# Patient Record
Sex: MALE | Race: BLACK OR AFRICAN AMERICAN | Employment: UNEMPLOYED | URBAN - METROPOLITAN AREA
[De-identification: names, ages, dates, MRNs, and addresses within clinical notes are randomized per-mention and may not be internally consistent; named-entity substitution may affect disease eponyms.]

---

## 2017-08-27 ENCOUNTER — HOSPITAL ENCOUNTER (EMERGENCY)
Age: 22
Discharge: HOME | End: 2017-08-28
Attending: EMERGENCY MEDICINE
Payer: COMMERCIAL

## 2017-08-27 VITALS
TEMPERATURE: 98.7 F | SYSTOLIC BLOOD PRESSURE: 131 MMHG | BODY MASS INDEX: 24.78 KG/M2 | RESPIRATION RATE: 20 BRPM | DIASTOLIC BLOOD PRESSURE: 56 MMHG | WEIGHT: 177 LBS | OXYGEN SATURATION: 99 % | HEIGHT: 71 IN | HEART RATE: 88 BPM

## 2017-08-27 DIAGNOSIS — J02.9 PHARYNGITIS, UNSPECIFIED ETIOLOGY: Primary | ICD-10-CM

## 2017-08-27 LAB
ANION GAP SERPL CALC-SCNC: 5 MMOL/L (ref 3–18)
BASOPHILS # BLD: 0.1 K/UL (ref 0–0.06)
BASOPHILS NFR BLD: 2 % (ref 0–2)
BUN SERPL-MCNC: 7 MG/DL (ref 7–18)
BUN/CREAT SERPL: 6 (ref 12–20)
CALCIUM SERPL-MCNC: 9.4 MG/DL (ref 8.5–10.1)
CHLORIDE SERPL-SCNC: 98 MMOL/L (ref 100–108)
CO2 SERPL-SCNC: 34 MMOL/L (ref 21–32)
CREAT SERPL-MCNC: 1.2 MG/DL (ref 0.6–1.3)
DIFFERENTIAL METHOD BLD: ABNORMAL
EOSINOPHIL # BLD: 0.1 K/UL (ref 0–0.4)
EOSINOPHIL NFR BLD: 1 % (ref 0–5)
ERYTHROCYTE [DISTWIDTH] IN BLOOD BY AUTOMATED COUNT: 13.2 % (ref 11.6–14.5)
GLUCOSE SERPL-MCNC: 97 MG/DL (ref 74–99)
HCT VFR BLD AUTO: 48.4 % (ref 36–48)
HGB BLD-MCNC: 15.5 G/DL (ref 13–16)
LYMPHOCYTES # BLD: 1.3 K/UL (ref 0.9–3.6)
LYMPHOCYTES NFR BLD: 20 % (ref 21–52)
MCH RBC QN AUTO: 27.9 PG (ref 24–34)
MCHC RBC AUTO-ENTMCNC: 32 G/DL (ref 31–37)
MCV RBC AUTO: 87.2 FL (ref 74–97)
MONOCYTES # BLD: 0.7 K/UL (ref 0.05–1.2)
MONOCYTES NFR BLD: 11 % (ref 3–10)
NEUTS SEG # BLD: 4.4 K/UL (ref 1.8–8)
NEUTS SEG NFR BLD: 66 % (ref 40–73)
PLATELET # BLD AUTO: 139 K/UL (ref 135–420)
PMV BLD AUTO: 10.3 FL (ref 9.2–11.8)
POTASSIUM SERPL-SCNC: 3.5 MMOL/L (ref 3.5–5.5)
RBC # BLD AUTO: 5.55 M/UL (ref 4.7–5.5)
SODIUM SERPL-SCNC: 137 MMOL/L (ref 136–145)
WBC # BLD AUTO: 6.7 K/UL (ref 4.6–13.2)

## 2017-08-27 PROCEDURE — 99281 EMR DPT VST MAYX REQ PHY/QHP: CPT

## 2017-08-27 PROCEDURE — 80048 BASIC METABOLIC PNL TOTAL CA: CPT | Performed by: PHYSICIAN ASSISTANT

## 2017-08-27 PROCEDURE — 85025 COMPLETE CBC W/AUTO DIFF WBC: CPT | Performed by: PHYSICIAN ASSISTANT

## 2017-08-27 PROCEDURE — 87081 CULTURE SCREEN ONLY: CPT | Performed by: EMERGENCY MEDICINE

## 2017-08-27 PROCEDURE — 87804 INFLUENZA ASSAY W/OPTIC: CPT | Performed by: EMERGENCY MEDICINE

## 2017-08-27 NOTE — LETTER
[facility] EMERGENCY DEPT 
[address] [facility] [address] 
[phone] Work/School Note Date: 8/27/2017 To Whom It May concern: 
 
Monica Selby - please excuse him from work 8/28/2017.  
 
Sincerely, 
 
 
 
 
,Cesar Tolentino RN BSN JAGDEEP NRP

## 2017-08-27 NOTE — LETTER
[address] [place] [address] [facility] EMERGENCY DEPT 
[address] [facility] [address] [phone] Work/School Note Date: 8/27/2017 To Whom It May concern: Taiwo Sanders was seen and treated today in the emergency room by the following provider(s): 
Attending Provider: Luke Mancilla MD. Taiwo Sanders - please excuse him from work 8/28/2017. Sincerely, Ivanranda Sellers RN BSN JAGDEEP NRP

## 2017-08-28 LAB
FLUAV AG NPH QL IA: NEGATIVE
FLUBV AG NOSE QL IA: NEGATIVE

## 2017-08-28 RX ORDER — AMOXICILLIN 500 MG/1
500 TABLET, FILM COATED ORAL 3 TIMES DAILY
Qty: 30 TAB | Refills: 0 | Status: SHIPPED | OUTPATIENT
Start: 2017-08-28 | End: 2019-10-22

## 2017-08-28 NOTE — DISCHARGE INSTRUCTIONS
Sore Throat: Care Instructions  Your Care Instructions    Infection by bacteria or a virus causes most sore throats. Cigarette smoke, dry air, air pollution, allergies, and yelling can also cause a sore throat. Sore throats can be painful and annoying. Fortunately, most sore throats go away on their own. If you have a bacterial infection, your doctor may prescribe antibiotics. Follow-up care is a key part of your treatment and safety. Be sure to make and go to all appointments, and call your doctor if you are having problems. It's also a good idea to know your test results and keep a list of the medicines you take. How can you care for yourself at home? · If your doctor prescribed antibiotics, take them as directed. Do not stop taking them just because you feel better. You need to take the full course of antibiotics. · Gargle with warm salt water once an hour to help reduce swelling and relieve discomfort. Use 1 teaspoon of salt mixed in 1 cup of warm water. · Take an over-the-counter pain medicine, such as acetaminophen (Tylenol), ibuprofen (Advil, Motrin), or naproxen (Aleve). Read and follow all instructions on the label. · Be careful when taking over-the-counter cold or flu medicines and Tylenol at the same time. Many of these medicines have acetaminophen, which is Tylenol. Read the labels to make sure that you are not taking more than the recommended dose. Too much acetaminophen (Tylenol) can be harmful. · Drink plenty of fluids. Fluids may help soothe an irritated throat. Hot fluids, such as tea or soup, may help decrease throat pain. · Use over-the-counter throat lozenges to soothe pain. Regular cough drops or hard candy may also help. These should not be given to young children because of the risk of choking. · Do not smoke or allow others to smoke around you. If you need help quitting, talk to your doctor about stop-smoking programs and medicines.  These can increase your chances of quitting for good. · Use a vaporizer or humidifier to add moisture to your bedroom. Follow the directions for cleaning the machine. When should you call for help? Call your doctor now or seek immediate medical care if:  · You have new or worse trouble swallowing. · Your sore throat gets much worse on one side. Watch closely for changes in your health, and be sure to contact your doctor if you do not get better as expected. Where can you learn more? Go to http://sebastien-thlaia.info/. Enter [phone] in the search box to learn more about \"Sore Throat: Care Instructions. \"  Current as of: July 29, 2016  Content Version: 11.3  © 2451-9188 [employer]. Care instructions adapted under license by [employer] (which disclaims liability or warranty for this information). If you have questions about a medical condition or this instruction, always ask your healthcare professional. [address] any warranty or liability for your use of this information.

## 2017-08-28 NOTE — ED NOTES
22 yo M with no significant PMH who presents to the ED cough, subjective fever, HA, and neck pain x 3 days. Mom notes daughters had similar symptoms last week. Denies sore throat, ear pressure, n/v. Took Tylenol PTA    I performed a brief evaluation, including history and physical, of the patient here in triage and I have determined that pt will need further treatment and evaluation from the main side ER physician. I have placed initial orders to help in expediting patients care.      August 27, 2017 at 11:00 PM - Yonatan Kang        Visit Vitals    /56 (BP 1 Location: Left arm, BP Patient Position: At rest)    Pulse 88    Temp 98.7 °F (37.1 °C)    Resp 20    Ht 5' 11\" (1.803 m)    Wt 80.3 kg (177 lb)    SpO2 99%    BMI 24.69 kg/m2

## 2017-08-28 NOTE — ED NOTES
Pt instructed to follow up with his PCP, take his antibiotics without fail, perform good oral care, and to return for worsening pain/swallowing/fever/photophobia/other concerns.

## 2017-08-28 NOTE — ED PROVIDER NOTES
HPI Comments: 11:17 PM Alberto Bell is a 23 y.o. male  presents to the ED c/o a cough which began 2 days ago. Pt also c/o fever, chills, sore throat, neck pain and a headache. Pt denies any sick contact. PCP: Stephanie Burk MD      The history is provided by the patient. History reviewed. No pertinent past medical history. History reviewed. No pertinent surgical history. History reviewed. No pertinent family history. Social History     Social History    Marital status: SINGLE     Spouse name: N/A    Number of children: N/A    Years of education: N/A     Occupational History    Not on file. Social History Main Topics    Smoking status: Never Smoker    Smokeless tobacco: Never Used    Alcohol use Yes      Comment: Socially    Drug use: No    Sexual activity: Not on file     Other Topics Concern    Not on file     Social History Narrative         ALLERGIES: Review of patient's allergies indicates no known allergies. Review of Systems   Constitutional: Positive for chills and fever. HENT: Positive for sore throat. Eyes: Negative. Respiratory: Positive for cough. Cardiovascular: Negative. Gastrointestinal: Negative. Genitourinary: Negative. Musculoskeletal: Positive for neck pain. Skin: Negative. Allergic/Immunologic: Negative. Neurological: Positive for headaches. Hematological: Negative. Psychiatric/Behavioral: Negative. All other systems reviewed and are negative. Vitals:    08/27/17 2257   BP: 131/56   Pulse: 88   Resp: 20   Temp: 98.7 °F (37.1 °C)   SpO2: 99%   Weight: 80.3 kg (177 lb)   Height: 5' 11\" (1.803 m)            Physical Exam   Constitutional: He is oriented to person, place, and time. He appears well-developed and well-nourished. No distress. HENT:   Head: Normocephalic. Mouth/Throat: Oropharynx is clear and moist.   Eyes: Conjunctivae and EOM are normal. Pupils are equal, round, and reactive to light.    Neck: Normal range of motion. Neck supple. Cardiovascular: Normal rate, regular rhythm, normal heart sounds and intact distal pulses. No murmur heard. Pulmonary/Chest: Effort normal and breath sounds normal. No respiratory distress. He has no wheezes. He has no rales. He exhibits no tenderness. Abdominal: Soft. Bowel sounds are normal. He exhibits no distension. There is no tenderness. There is no rebound. Musculoskeletal: Normal range of motion. He exhibits no edema or tenderness. Neurological: He is alert and oriented to person, place, and time. No cranial nerve deficit. He exhibits normal muscle tone. Coordination normal.   Skin: Skin is warm and dry. No rash noted. Psychiatric: He has a normal mood and affect. His behavior is normal. Judgment and thought content normal.   Nursing note and vitals reviewed. MDM  Number of Diagnoses or Management Options  Pharyngitis, unspecified etiology: new and requires workup  Risk of Complications, Morbidity, and/or Mortality  Presenting problems: moderate  Diagnostic procedures: moderate  Management options: moderate    Patient Progress  Patient progress: stable    ED Course       Procedures     Vitals:  Patient Vitals for the past 12 hrs:   Temp Pulse Resp BP SpO2   08/27/17 2257 98.7 °F (37.1 °C) 88 20 131/56 99 %       X-Ray, CT or other radiology findings or impressions:  No orders to display       Orders:  Orders Placed This Encounter    CBC WITH AUTOMATED DIFF     Standing Status:   Standing     Number of Occurrences:   1    METABOLIC PANEL, BASIC     Standing Status:   Standing     Number of Occurrences:   1       Reevaluation, Progress notes, Consult notes, or additional Procedure notes: patient remained stable.       Diagnosis:     Disposition: D/C    Follow-up Information     None           Patient's Medications    No medications on file         Scribe Attestation           Rexann Holstein acting as a scribe for and in the presence of Yash Mitchell MD              August 27, 2017 at 11:17 PM                            Provider Attestation:           I personally performed the services described in the documentation, reviewed the documentation, as recorded by the scribe in my presence, and it accurately and completely records my words and actions.  August 27, 2017 at 11:17 PM - Peg Early MD

## 2017-08-30 LAB
B-HEM STREP THROAT QL CULT: NEGATIVE
BACTERIA SPEC CULT: NORMAL
SERVICE CMNT-IMP: NORMAL

## 2019-10-22 ENCOUNTER — APPOINTMENT (OUTPATIENT)
Dept: GENERAL RADIOLOGY | Age: 24
End: 2019-10-22
Attending: EMERGENCY MEDICINE
Payer: COMMERCIAL

## 2019-10-22 ENCOUNTER — HOSPITAL ENCOUNTER (EMERGENCY)
Age: 24
Discharge: HOME | End: 2019-10-23
Attending: EMERGENCY MEDICINE
Payer: COMMERCIAL

## 2019-10-22 VITALS
WEIGHT: 180 LBS | TEMPERATURE: 99.2 F | SYSTOLIC BLOOD PRESSURE: 128 MMHG | BODY MASS INDEX: 25.77 KG/M2 | HEIGHT: 70 IN | OXYGEN SATURATION: 100 % | RESPIRATION RATE: 14 BRPM | DIASTOLIC BLOOD PRESSURE: 80 MMHG | HEART RATE: 91 BPM

## 2019-10-22 DIAGNOSIS — S86.912A STRAIN OF LEFT KNEE, INITIAL ENCOUNTER: Primary | ICD-10-CM

## 2019-10-22 PROCEDURE — 99283 EMERGENCY DEPT VISIT LOW MDM: CPT

## 2019-10-22 PROCEDURE — 73560 X-RAY EXAM OF KNEE 1 OR 2: CPT

## 2019-10-22 RX ORDER — IBUPROFEN 600 MG/1
600 TABLET ORAL
Qty: 18 TAB | Refills: 0 | Status: SHIPPED | OUTPATIENT
Start: 2019-10-22

## 2019-10-22 NOTE — LETTER
[address] [place] [address] [facility] EMERGENCY DEPT 
[address] [facility] [address] 
[phone] Work/School Note Date: 10/22/2019 To Whom It May concern: Mickiel Meckel was seen and treated today in the emergency room by the following provider(s): 
Attending Provider: Nica Christiansen MD. Mickiel Meckel may return to work on 10/25/19. Sincerely, 
 
 
 
 
/bry

## 2019-10-23 NOTE — ED PROVIDER NOTES
Pt c/o l knee pain, says twisted when landed playing b ball about 2 hours pta. No other leg pain. No weakness or numbness. No fever. C/o walking w a limp due to pain. No meds taken pta. No rash or redness. Denies other injury. History reviewed. No pertinent past medical history. History reviewed. No pertinent surgical history. History reviewed. No pertinent family history. Social History     Socioeconomic History    Marital status: SINGLE     Spouse name: Not on file    Number of children: Not on file    Years of education: Not on file    Highest education level: Not on file   Occupational History    Not on file   Social Needs    Financial resource strain: Not on file    Food insecurity:     Worry: Not on file     Inability: Not on file    Transportation needs:     Medical: Not on file     Non-medical: Not on file   Tobacco Use    Smoking status: Never Smoker    Smokeless tobacco: Never Used   Substance and Sexual Activity    Alcohol use: Yes     Comment: Socially    Drug use: No    Sexual activity: Not on file   Lifestyle    Physical activity:     Days per week: Not on file     Minutes per session: Not on file    Stress: Not on file   Relationships    Social connections:     Talks on phone: Not on file     Gets together: Not on file     Attends Synagogue service: Not on file     Active member of club or organization: Not on file     Attends meetings of clubs or organizations: Not on file     Relationship status: Not on file    Intimate partner violence:     Fear of current or ex partner: Not on file     Emotionally abused: Not on file     Physically abused: Not on file     Forced sexual activity: Not on file   Other Topics Concern    Not on file   Social History Narrative    Not on file         ALLERGIES: Patient has no known allergies. Review of Systems   Constitutional: Negative for fever. Respiratory: Negative for shortness of breath.     Gastrointestinal: Negative for nausea. Musculoskeletal: Positive for arthralgias. Negative for back pain. Skin: Negative for wound. Neurological: Negative for weakness and numbness. All other systems reviewed and are negative. Vitals:    10/22/19 2244   BP: 128/80   Pulse: 91   Resp: 14   Temp: 99.2 °F (37.3 °C)   SpO2: 100%   Weight: 81.6 kg (180 lb)   Height: 5' 10\" (1.778 m)            Physical Exam   Constitutional: He is oriented to person, place, and time. He appears well-developed. HENT:   Head: Normocephalic and atraumatic. Neck: Normal range of motion. Pulmonary/Chest: Effort normal.   Musculoskeletal: He exhibits tenderness. + left mild diffuse ttp l knee, w mild swelling. Pain w deep flexion but from present. No other leg ttp. nvi     Neurological: He is alert and oriented to person, place, and time. Skin: Skin is warm and dry. Capillary refill takes less than 2 seconds. No rash noted. Psychiatric: He has a normal mood and affect. Nursing note and vitals reviewed. MDM       Procedures        Vitals:  No data found. Medications ordered:   Medications - No data to display      Lab findings:  No results found for this or any previous visit (from the past 12 hour(s)). X-Ray, CT or other radiology findings or impressions:  XR KNEE LT MAX 2 VWS   Final Result   IMPRESSION:       Moderate joint effusion. Progress notes, Consult notes or additional Procedure notes:   No emc. From. Nvi. Stable for dc and close f/u. Det ret inst given. Pt verb und. No s/o other injury. Diagnosis:   1.  Strain of left knee, initial encounter        Disposition: home    Follow-up Information     Follow up With Specialties Details Why Contact Info    Norman Sal MD Orthopedic Surgery Schedule an appointment as soon as possible for a visit in 1 week As needed [address] ([facility])  [phone]             Discharge Medication List as of 10/22/2019 11:31 PM      START taking these medications    Details   ibuprofen (MOTRIN) 600 mg tablet Take 1 Tab by mouth every six (6) hours as needed for Pain., Print, Disp-18 Tab, R-0

## 2019-10-23 NOTE — ED NOTES
Received report from off-going-shift RN (Avelina),and assumed care of patient. Call bell with reach.

## 2019-10-23 NOTE — ED TRIAGE NOTES
Pt was at gym playing basket ball . .. He twisted his lt knee @ 2 hrs ago. .. Says, \"I think I dislocated my knee\"  +carol. Baudilio Mckeon

## 2019-10-25 ENCOUNTER — OFFICE VISIT (OUTPATIENT)
Dept: ORTHOPEDIC SURGERY | Facility: CLINIC | Age: 24
End: 2019-10-25

## 2019-10-25 VITALS
TEMPERATURE: 97 F | HEIGHT: 70 IN | DIASTOLIC BLOOD PRESSURE: 70 MMHG | HEART RATE: 87 BPM | SYSTOLIC BLOOD PRESSURE: 121 MMHG | RESPIRATION RATE: 16 BRPM

## 2019-10-25 DIAGNOSIS — M25.00 HEMARTHROSIS: ICD-10-CM

## 2019-10-25 DIAGNOSIS — S83.005A DISLOCATION OF LEFT PATELLA, INITIAL ENCOUNTER: Primary | ICD-10-CM

## 2019-10-25 DIAGNOSIS — M25.562 ACUTE PAIN OF LEFT KNEE: ICD-10-CM

## 2019-10-25 RX ORDER — IBUPROFEN 600 MG/1
TABLET ORAL
COMMUNITY

## 2019-10-25 NOTE — PROGRESS NOTES
1. Have you been to the ER, urgent care clinic since your last visit? Hospitalized since your last visit? Yes, [facility] ED    2. Have you seen or consulted any other health care providers outside of the [address] [place] [address] since your last visit? Include any pap smears or colon screening.    Yes, See above

## 2019-10-25 NOTE — LETTER
NOTIFICATION RETURN TO WORK  
 
10/25/2019 1:57 PM 
 
Mr. Jaye Gilliland [address] 
[facility] [address] To Whom It May Concern: Jaye Gilliland is currently under the care of [address] [place] [address]. He will return to work on: 11-25-19--no restrictions. He is to remain out of work until then. If there are questions or concerns please have the patient contact our office.  
 
 
 
Sincerely, 
 
 
Kimani Wild MD

## 2019-10-25 NOTE — PROGRESS NOTES
Patient: Cynthia Romeo                MRN: [id number]       SSN: [id number]  YOB: 1995        AGE: 27 y.o. SEX: male    PCP: No primary care provider on file. 10/25/19    Chief Complaint   Patient presents with    Knee Pain     left knee pain     HISTORY:  Cynthia Romeo is a 27 y.o. male who sustained a left knee basketball injury on 10/22/19. He felt a sudden pain when he landed wrong when he went up for a rebound. He landed wrong as he collided with another player. Mr. Teresa Alexander brother reduced the obvious patella dislocation with leg extension and pressure applied. [facility] where left knee x rays revealed large effusion. He was provided a knee immobilizer, ibuprofen and orthopedic referral. He has been experiencing left knee pain and swelling since the injury. He states he has only slept 4 total hours since his injury due to pain and swelling. Pain Assessment  10/25/2019   Location of Pain Knee   Location Modifiers Left   Severity of Pain 8   Quality of Pain Throbbing; Sharp   Duration of Pain Persistent   Frequency of Pain Constant   Aggravating Factors Bending; Other (Comment)   Aggravating Factors Comment when laying down & with any type of movements   Limiting Behavior Yes   Relieving Factors NSAID;Ice;Elevation   Result of Injury Yes   Work-Related Injury No   Type of Injury Fall     Occupation, etc:  Mr. 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 He lives in [place] with his parents, 3 sisters, and brother. Mr. Polina Vargas is 5'10\" tall. No results found for: HBA1C, HGBE8, KDJ5CJTN, YCW3PNRE, KLG3JDGD  Weight Metrics 10/25/2019   Weight -       There is no problem list on file for this patient. REVIEW OF SYSTEMS: All Below are Negative except: See HPI   Constitutional: negative for fever, chills, and weight loss.    Cardiovascular: negative for chest pain, claudication, leg swelling, SOB, COSBY   Gastrointestinal: Negative for pain, N/V/C/D, Blood in stool or urine, dysuria, hematuria, incontinence, pelvic pain. Musculoskeletal: See HPI   Neurological: Negative for dizziness and weakness. Negative for headaches, Visual changes, confusion, seizures   Phychiatric/Behavioral: Negative for depression, memory loss, substance abuse. Extremities: Negative for hair changes, rash, or skin lesion changes. Hematologic: Negative for bleeding problems, bruising, pallor or swollen lymph nodes   Peripheral Vascular: No calf pain, no circulation deficits.     Social History     Socioeconomic History    Marital status: SINGLE     Spouse name: Not on file    Number of children: Not on file    Years of education: Not on file    Highest education level: Not on file   Occupational History    Not on file   Social Needs    Financial resource strain: Not on file    Food insecurity:     Worry: Not on file     Inability: Not on file    Transportation needs:     Medical: Not on file     Non-medical: Not on file   Tobacco Use    Smoking status: Never Smoker    Smokeless tobacco: Never Used   Substance and Sexual Activity    Alcohol use: Not on file    Drug use: Not on file    Sexual activity: Not on file   Lifestyle    Physical activity:     Days per week: Not on file     Minutes per session: Not on file    Stress: Not on file   Relationships    Social connections:     Talks on phone: Not on file     Gets together: Not on file     Attends Scientology service: Not on file     Active member of club or organization: Not on file     Attends meetings of clubs or organizations: Not on file     Relationship status: Not on file    Intimate partner violence:     Fear of current or ex partner: Not on file     Emotionally abused: Not on file     Physically abused: Not on file     Forced sexual activity: Not on file   Other Topics Concern    Not on file   Social History Narrative    Not on file      No Known Allergies   Current Outpatient Medications   Medication Sig  ibuprofen (MOTRIN) 600 mg tablet Take  by mouth every six (6) hours as needed for Pain. No current facility-administered medications for this visit. PHYSICAL EXAMINATION:  Visit Vitals  /70 (BP 1 Location: Left arm, BP Patient Position: Sitting)   Pulse 87   Temp 97 °F (36.1 °C) (Oral)   Resp 16   Ht 5' 10\" (1.778 m)      ORTHO EXAMINATION:  Examination Right knee Left knee   Skin Intact Intact   Range of motion 120-0 120-0   Effusion - ++++   Medial joint line tenderness - + medial retinaculum   Lateral joint line tenderness - -   Popliteal tenderness - -   Osteophytes palpable - -   Ginnas - -   Patella crepitus - -   Anterior drawer - -   Lateral laxity - -   Medial laxity - -   Varus deformity - -   Valgus deformity - -   Pretibial edema - -   Calf tenderness - -     Active knee extension is intact, guarding due to pain    TIME OUT:  Chart reviewed for the following:   Adams Hartley MD, have reviewed the History, Physical and updated the Allergic reactions for Alden Wakefield   TIME OUT performed immediately prior to start of procedure:  Adams Hartley MD, have performed the following reviews on Sam Shaper prior to the start of the procedure:          * Patient was identified by name and date of birth   * Agreement on procedure being performed was verified  * Risks and Benefits explained to the patient  * Procedure site verified and marked as necessary  * Patient was positioned for comfort  * Consent was obtained     Time: 1:50 PM     Date of procedure: 10/25/2019  Procedure performed by:  Maira Matthews MD  Mr. Celsa Lobo tolerated the procedure well with no complications. RADIOGRAPHS:  XR LEFT KNEE 10/23/19 [facility]  IMPRESSION: Moderate joint effusion  -I have independently reviewed these images during this office visit. -Dr. Demarcus Bunch:  Three views - No fractures, + effusion, no joint space narrowing, no osteophytes present.  Kellgren David grade 0, patella is well located      IMPRESSION:      ICD-10-CM ICD-9-CM    1. Dislocation of left patella, initial encounter S83.005A 836.3 REFERRAL TO PHYSICAL THERAPY   2. Acute pain of left knee M25.562 719.46 REFERRAL TO PHYSICAL THERAPY   3. Hemarthrosis M25.00 719.10 DRAIN/INJECT LARGE JOINT/BURSA     PLAN:  After timeout and under sterile conditions, left knee aspirated 185 cc of bloody fluid. The fluid was discarded. There is no need for surgery at this time. He will start a brief course of outpatient physical therapy. Continue using knee immobilizer. OTC analgesics for pain. Stay out of work for 4 weeks, resume full activity at that time. Work note provided. ACE wrap provided. He will follow up as needed.       Scribed by Feliciano Chapman ([address]) as dictated by Genevieve Adames MD

## 2019-10-31 ENCOUNTER — HOSPITAL ENCOUNTER (OUTPATIENT)
Dept: PHYSICAL THERAPY | Age: 24
Discharge: HOME | End: 2019-10-31
Payer: COMMERCIAL

## 2019-10-31 PROCEDURE — 97014 ELECTRIC STIMULATION THERAPY: CPT

## 2019-10-31 PROCEDURE — 97110 THERAPEUTIC EXERCISES: CPT

## 2019-10-31 PROCEDURE — 97161 PT EVAL LOW COMPLEX 20 MIN: CPT

## 2019-10-31 NOTE — PROGRESS NOTES
PT DAILY TREATMENT NOTE 8-14    PPatient Name: Gonzalo Stapleton  Date:10/31/2019  : 1995  [x]  Patient  Verified  Payor: BLUE CROSS / Plan: [facility] / Product Type: PPO /    In time:1010  Out time:1050  Total Treatment Time (min): 40  Visit #: 1 of 10    Medicare/BCBS Only   Total Timed Codes (min):  15 1:1 Treatment Time:  30       Treatment Area: Pain in left knee [M25.562]    SUBJECTIVE  Pain Level (0-10 scale): 5  Any medication changes, allergies to medications, adverse drug reactions, diagnosis change, or new procedure performed?: [x] No    [] Yes (see summary sheet for update)  Subjective functional status/changes:   [x] see eval form in paper chart    OBJECTIVE  Modality rationale: decrease edema, decrease inflammation and decrease pain to improve the patients ability to tolerate flexion and wt bearing   Min Type Additional Details   10 [x] Estim:  [x]Unatt       [x]IFC  []Premod                        []Other:  [x]w/ice   []w/heat  Position: supine with towel roll  Location: left knee    [] Estim: []Att    []TENS instruct  []NMES                    []Other:  []w/US   []w/ice   []w/heat  Position:  Location:    []  Traction: [] Cervical       []Lumbar                       [] Prone          []Supine                       []Intermittent   []Continuous Lbs:  [] before manual  [] after manual    []  Ultrasound: []Continuous   [] Pulsed                           []1MHz   []3MHz Location:  W/cm2:    []  Iontophoresis with dexamethasone         Location: [] Take home patch   [] In clinic    []  Ice     []  heat  []  Ice massage  []  Laser   []  Anodyne Position:  Location:    []  Laser with stim  []  Other: Position:  Location:    []  Vasopneumatic Device Pressure:       [] lo [] med [] hi   Temperature: [] lo [] med [] hi   [x] Skin assessment post-treatment:  [x]intact []redness- no adverse reaction    []redness  adverse reaction:   15 min Therapeutic Exercise:  [x] See flow sheet : including PROM left knee flexion to tolerance with patellar blocking   Rationale: increase ROM and increase strength to improve the patients ability to tolerate activity and increase wt bearing tolerance          With   [x] TE   [] TA   [] neuro   [] other: Patient Education: [x] Review HEP    [] Progressed/Changed HEP based on:   [x] positioning   [] body mechanics   [] transfers   [x] heat/ice application    [] other:        Pain Level (0-10 scale) post treatment: 4    ASSESSMENT/Changes in Function:       [x]  See Plan of Care  []  See progress note/recertification  []  See Discharge Summary         Goals:  Short Term Goals: To be accomplished in 1 weeks:  1. Establish HEP for ROM and strengthening  Eval: established and reviewed; MD contacted for clarification of wt bearing status and use of immobilizer (currently following ER recommendations)     Long Term Goals: To be accomplished in 10 treatments:  1. Patient will be independent with HEP for ROM and strengthening  Eval: established  Current:  2. Patient will demonstrate 0-120 degrees AROM left knee to increase ease of ADLs  Eval: 25-65 with pain and guarding  Current:  3. Patient will increase FOTO > 25 pts to increase functional mobility and facilitate return to work  Eval: 26  Current:  4.  Patient will ascend and descend steps with reciprocal pattern to increase ease of entry into home and bedroom  Eval: step to or bumping up as able  Current:    PLAN  []  Upgrade activities as tolerated     [x]  Continue plan of care  []  Update interventions per flow sheet       []  Discharge due to:_  []  Other:_      Miroslava Wilhelm, PT 10/31/2019  11:11 AM

## 2019-10-31 NOTE — PROGRESS NOTES
[facility] [address]  [address] [facility] [address], [address] [phone] fax    Plan of Care/ Statement of Necessity for Physical Therapy Services           Patient name: Sotero Worthy Start of Care: 10/31/2019   Referral source: Murphy Shepherd MD : 1995    Medical Diagnosis: Pain in left knee [M25.562]  Payor: BLUE CROSS / Plan: [employer] [facility] [place] / Product Type: PPO /  Onset Date:10/22/19    Treatment Diagnosis: left knee pain   Prior Hospitalization: see medical history Provider#: [id number]   Medications: Verified on Patient summary List    Comorbidities: none reported   Prior Level of Function: independent, working FT at [employer], active with basketball    The Plan of Care and following information is based on the information from the initial evaluation. Assessment/ key information: Patient is a 27 y.o.male who presents to PT with Pain in left knee [M25.562]. He reports he was playing basketball, had a mid air collision, then landed hard with pain. He then put on his brace and continued to play until falling again, then realizing he needed to go to the ER due to patellar dislocation and effusion. He presents today with knee immobilizer, crutches and NWB status (per ER) with very guarded motion and pain. The patient will benefit from skilled PT services to address these deficits and facilitate return to premorbid activity level and improved quality of life.     Evaluation Complexity History LOW Complexity : Zero comorbidities / personal factors that will impact the outcome / POC; Examination MEDIUM Complexity : 3 Standardized tests and measures addressing body structure, function, activity limitation and / or participation in recreation  ;Presentation LOW Complexity : Stable, uncomplicated  ;Clinical Decision Making MEDIUM Complexity : FOTO score of 26-74  Overall Complexity Rating: LOW   Problem List: pain affecting function, decrease ROM, decrease strength, edema affecting function, impaired gait/ balance, decrease ADL/ functional abilitiies, decrease activity tolerance, decrease flexibility/ joint mobility and decrease transfer abilities   Treatment Plan may include any combination of the following: Therapeutic exercise, Therapeutic activities, Neuromuscular re-education, Physical agent/modality, Gait/balance training, Manual therapy, Patient education, Self Care training and Functional mobility training  Patient / Family readiness to learn indicated by: asking questions, trying to perform skills and interest  Persons(s) to be included in education: patient (P)  Barriers to Learning/Limitations: None  Patient Goal (s): get back to normal  Patient Self Reported Health Status: good  Rehabilitation Potential: good  Short Term Goals: To be accomplished in 1 weeks:  1. Establish HEP for ROM and strengthening    Long Term Goals: To be accomplished in 10 treatments:  1. Patient will be independent with HEP for ROM and strengthening  2. Patient will demonstrate 0-120 degrees AROM left knee to increase ease of ADLs  3. Patient will increase FOTO > 25 pts to increase functional mobility and facilitate return to work  4. Patient will ascend and descend steps with reciprocal pattern to increase ease of entry into home and bedroom    Frequency / Duration: Patient to be seen 2-3 times per week for 10 treatments. Patient/ Caregiver education and instruction: Diagnosis, prognosis, self care, activity modification, brace/ splint application and exercises   [x]  Plan of care has been reviewed with DAYAMI Frost, PT 10/31/2019 11:06 AM    ________________________________________________________________________    I certify that the above Therapy Services are being furnished while the patient is under my care. I agree with the treatment plan and certify that this therapy is necessary.     Physician's Signature:____________Date:_________TIME:________    ** Signature, Date and Time must be completed for valid certification **    Please sign and return to [facility] [address]  [address] [facility] [address], [address] [phone] fax

## 2019-11-04 ENCOUNTER — HOSPITAL ENCOUNTER (OUTPATIENT)
Dept: PHYSICAL THERAPY | Age: 24
Discharge: HOME | End: 2019-11-04
Payer: COMMERCIAL

## 2019-11-04 PROCEDURE — 97530 THERAPEUTIC ACTIVITIES: CPT

## 2019-11-04 PROCEDURE — 97110 THERAPEUTIC EXERCISES: CPT

## 2019-11-04 NOTE — PROGRESS NOTES
PT DAILY TREATMENT NOTE 10-18    Patient Name: Alberto Form  Date:2019  : 1995  [x]  Patient  Verified  Payor: BLUE CROSS / Plan: [address] [place] [address] [place] / Product Type: PPO /    In time:802  Out time:851  Total Treatment Time (min): 49  Visit #: 2 of 10    Medicare/BCBS Only   Total Timed Codes (min):  39 1:1 Treatment Time:  39       Treatment Area: No admission diagnoses are documented for this encounter.     SUBJECTIVE  Pain Level (0-10 scale): 3  Any medication changes, allergies to medications, adverse drug reactions, diagnosis change, or new procedure performed?: [x] No    [] Yes (see summary sheet for update)  Subjective functional status/changes:   [] No changes reported  Pt states he has been working on straightening his leg, does not ice as often as\"maybe I should\"    OBJECTIVE    Modality rationale: decrease edema, decrease inflammation and decrease pain to improve the patients ability to improve functional ROM for ambualtion   Min Type Additional Details    [] Estim:  []Unatt       []IFC  []Premod                        []Other:  []w/ice   []w/heat  Position:  Location:    [] Estim: []Att    []TENS instruct  []NMES                    []Other:  []w/US   []w/ice   []w/heat  Position:  Location:    []  Traction: [] Cervical       []Lumbar                       [] Prone          []Supine                       []Intermittent   []Continuous Lbs:  [] before manual  [] after manual    []  Ultrasound: []Continuous   [] Pulsed                           []1MHz   []3MHz W/cm2:  Location:    []  Iontophoresis with dexamethasone         Location: [] Take home patch   [] In clinic   10 [x]  Ice     []  heat  []  Ice massage  []  Laser   []  Anodyne Position: LE elevated in supine  Location: left knee    []  Laser with stim  []  Other:  Position:  Location:    []  Vasopneumatic Device Pressure:       [] lo [] med [] hi   Temperature: [] lo [] med [] hi   [] Skin assessment post-treatment:  [x]intact []redness- no adverse reaction    []redness  adverse reaction:     29 min Therapeutic Exercise:  [x] See flow sheet :   Rationale: increase ROM and increase strength to improve the patients ability to perform ADLs    10 min Therapeutic Activity:  [x]  See flow sheet :   Rationale: increase strength, improve coordination and improve balance  to improve the patients ability to ambulate with normalized pattern          With   [] TE   [] TA   [] neuro   [] other: Patient Education: [x] Review HEP    [] Progressed/Changed HEP based on:   [] positioning   [] body mechanics   [] transfers   [] heat/ice application    [] other:      Other Objective/Functional Measures:   Left knee AROM = 8-73degrees     Pain Level (0-10 scale) post treatment: 0    ASSESSMENT/Changes in Function: Pt was able to initiate exercises per flow sheet without increase in symptoms. Pt was instructed on progressively increasing weight bearing with axillary crutches. Pt ROM progressing slowly, pt remains guarded. Patient will continue to benefit from skilled PT services to modify and progress therapeutic interventions, address functional mobility deficits, address ROM deficits, address strength deficits, analyze and address soft tissue restrictions, analyze and cue movement patterns, analyze and modify body mechanics/ergonomics, assess and modify postural abnormalities and address imbalance/dizziness to attain remaining goals. []  See Plan of Care  []  See progress note/recertification  []  See Discharge Summary         Progress towards goals / Updated goals:  Short Term Goals: To be accomplished in 1 weeks:  1. Establish HEP for ROM and strengthening  Eval: established and reviewed; MD contacted for clarification of wt bearing status and use of immobilizer (currently following ER recommendations)   Reviewed, reports compliance     Long Term Goals: To be accomplished in 10 treatments:  1.  Patient will be independent with HEP for ROM and strengthening   Eval: established   Current: reports compliance  2. Patient will demonstrate 0-120 degrees AROM left knee to increase ease of ADLs   Eval: 25-65 with pain and guarding   Current: AROM = lacking 8-73  3. Patient will increase FOTO > 25 pts to increase functional mobility and facilitate return to work   Eval: 26   Current: to be reassessed at [address]  4.  Patient will ascend and descend steps with reciprocal pattern to increase ease of entry into home and bedroom   Eval: step to or bumping up as able   Current: gait training with progressive weight bearing    PLAN  [x]  Upgrade activities as tolerated     [x]  Continue plan of care  []  Update interventions per flow sheet       []  Discharge due to:_  []  Other:_      Kulwant Smith PT 11/4/2019  8:09 AM    Future Appointments   Date Time Provider Kal Juárez   11/6/2019  3:00 PM Petrona Benson, ASIA [facility]   11/8/2019  3:00 PM Petrona Benson PT [facility]   11/11/2019  8:30 AM Fallon Saxena [facility]   11/13/2019 11:30 AM [facility]   11/15/2019  8:30 AM Petrona Benson, ASIA [facility]

## 2019-11-06 ENCOUNTER — HOSPITAL ENCOUNTER (OUTPATIENT)
Dept: PHYSICAL THERAPY | Age: 24
Discharge: HOME | End: 2019-11-06
Payer: COMMERCIAL

## 2019-11-06 PROCEDURE — 97110 THERAPEUTIC EXERCISES: CPT

## 2019-11-06 PROCEDURE — 97530 THERAPEUTIC ACTIVITIES: CPT

## 2019-11-06 NOTE — PROGRESS NOTES
PT DAILY TREATMENT NOTE 10-18    Patient Name: Mickiel Meckel  Date:2019  : 1995  [x]  Patient  Verified  Payor: BLUE CROSS / Plan: [employer] [facility] / Product Type: PPO /    In time:306  Out time:355  Total Treatment Time (min): 49  Visit #: 3 of 10    Medicare/BCBS Only   Total Timed Codes (min):  39 1:1 Treatment Time:  39       Treatment Area: Pain in left knee [M25.562]    SUBJECTIVE  Pain Level (0-10 scale): 0  Any medication changes, allergies to medications, adverse drug reactions, diagnosis change, or new procedure performed?: [x] No    [] Yes (see summary sheet for update)  Subjective functional status/changes:   [] No changes reported  Pt has no significant changes to report today.      OBJECTIVE    Modality rationale: decrease edema and decrease inflammation to improve the patients ability to increased AROM knee flexion for ambulation    Min Type Additional Details    [] Estim:  []Unatt       []IFC  []Premod                        []Other:  []w/ice   []w/heat  Position:  Location:    [] Estim: []Att    []TENS instruct  []NMES                    []Other:  []w/US   []w/ice   []w/heat  Position:  Location:    []  Traction: [] Cervical       []Lumbar                       [] Prone          []Supine                       []Intermittent   []Continuous Lbs:  [] before manual  [] after manual    []  Ultrasound: []Continuous   [] Pulsed                           []1MHz   []3MHz W/cm2:  Location:    []  Iontophoresis with dexamethasone         Location: [] Take home patch   [] In clinic   10 [x]  Ice     []  heat  []  Ice massage  []  Laser   []  Anodyne Position: elevated supine  Location: left knee    []  Laser with stim  []  Other:  Position:  Location:    []  Vasopneumatic Device Pressure:       [] lo [] med [] hi   Temperature: [] lo [] med [] hi   [] Skin assessment post-treatment:  [x]intact []redness- no adverse reaction    []redness  adverse reaction:     24 min Therapeutic Exercise: [x] See flow sheet :   Rationale: increase ROM and increase strength to improve the patients ability to perform ADLs    15 min Therapeutic Activity:  [x]  See flow sheet : including gait training with SBA with unilateral crutch; forward ambulation in parallel bars 3 laps with 1UE and 3 laps without UE; lateral ambulation 3 laps with finger tips; tactile cues to increase weight bearing through left LE   Rationale: increase strength, improve coordination and improve balance  to improve the patients ability to ambulate without AD     With   [] TE   [] TA   [] neuro   [] other: Patient Education: [x] Review HEP    [] Progressed/Changed HEP based on:   [] positioning   [] body mechanics   [] transfers   [] heat/ice application    [] other:      Other Objective/Functional Measures:   Left knee AROM = 6-80 (PROM flexion 88)     Pain Level (0-10 scale) post treatment: 0    ASSESSMENT/Changes in Function: Pt continues to be apprehensive and demonstrate fear with weight bearing on left LE despite denying pain. Pt improving weight bearing with encouragement and tactile cues for weight shifting. Pt instructed on unilateral axillary crutch for ambulation and to increase use of elevation and icing at home due to persistent inflammation/effusion; pt verbalized understanding. Patient will continue to benefit from skilled PT services to modify and progress therapeutic interventions, address functional mobility deficits, address ROM deficits, address strength deficits, analyze and address soft tissue restrictions, analyze and cue movement patterns, analyze and modify body mechanics/ergonomics, assess and modify postural abnormalities, address imbalance/dizziness and instruct in home and community integration to attain remaining goals. []  See Plan of Care  []  See progress note/recertification  []  See Discharge Summary         Progress towards goals / Updated goals:  Short Term Goals: To be accomplished in 1 weeks:  1. Establish HEP for ROM and strengthening  Eval: established and reviewed; MD contacted for clarification of wt bearing status and use of immobilizer (currently following ER recommendations)              Reviewed, reports compliance     Long Term Goals: To be accomplished in 10 treatments:  1. Patient will be independent with HEP for ROM and strengthening              Eval: established              Current: reports compliance  2. Patient will demonstrate 0-120 degrees AROM left knee to increase ease of ADLs              Eval: 25-65 with pain and guarding              Current: AROM = lacking 6-80  3. Patient will increase FOTO > 25 pts to increase functional mobility and facilitate return to work              Eval: 26              Current: to be reassessed at [address]  4.  Patient will ascend and descend steps with reciprocal pattern to increase ease of entry into home and bedroom              Eval: step to or bumping up as able              Current: gait training with progressive weight bearing; improving reciprocal pattern with cuing     PLAN  [x]  Upgrade activities as tolerated     [x]  Continue plan of care  []  Update interventions per flow sheet       []  Discharge due to:_  []  Other:_      Rancho Dunn, PT 11/6/2019  4:37 PM    Future Appointments   Date Time Provider Kal Juárez   11/8/2019  3:00 PM Patria Marino, PT [facility]   11/11/2019  8:30 AM Corrinne Genera [facility]   11/13/2019 11:30 AM [facility]   11/15/2019  8:30 AM Patria Marino PT [facility]

## 2019-11-08 ENCOUNTER — HOSPITAL ENCOUNTER (OUTPATIENT)
Dept: PHYSICAL THERAPY | Age: 24
Discharge: HOME | End: 2019-11-08
Payer: COMMERCIAL

## 2019-11-08 ENCOUNTER — DOCUMENTATION ONLY (OUTPATIENT)
Dept: ORTHOPEDIC SURGERY | Age: 24
End: 2019-11-08

## 2019-11-08 PROCEDURE — 97530 THERAPEUTIC ACTIVITIES: CPT

## 2019-11-08 PROCEDURE — 97116 GAIT TRAINING THERAPY: CPT

## 2019-11-08 PROCEDURE — 97110 THERAPEUTIC EXERCISES: CPT

## 2019-11-08 NOTE — PROGRESS NOTES
Patient dropped of Return to Work Certification form.      Patient requested form be faxed to [phone]    Patient will  original from [facility]    Best contact [phone]

## 2019-11-08 NOTE — PROGRESS NOTES
PT DAILY TREATMENT NOTE 10-18    Patient Name: Yoly Driver  Date:2019  : 1995  [x]  Patient  Verified  Payor: BLUE CROSS / Plan: [facility] [place] / Product Type: PPO /    In time:302  Out time:358  Total Treatment Time (min): 56  Visit #: 4 of 10    Medicare/BCBS Only   Total Timed Codes (min):  46 1:1 Treatment Time:  46       Treatment Area: Pain in left knee [M25.562]    SUBJECTIVE  Pain Level (0-10 scale): 0  Any medication changes, allergies to medications, adverse drug reactions, diagnosis change, or new procedure performed?: [x] No    [] Yes (see summary sheet for update)  Subjective functional status/changes:   [] No changes reported  Pt states he has been elevating but not icing his knee.      OBJECTIVE    Modality rationale: decrease edema, decrease inflammation and decrease pain to improve the patients ability to increase knee flexion and extension for gait   Min Type Additional Details    [] Estim:  []Unatt       []IFC  []Premod                        []Other:  []w/ice   []w/heat  Position:  Location:    [] Estim: []Att    []TENS instruct  []NMES                    []Other:  []w/US   []w/ice   []w/heat  Position:  Location:    []  Traction: [] Cervical       []Lumbar                       [] Prone          []Supine                       []Intermittent   []Continuous Lbs:  [] before manual  [] after manual    []  Ultrasound: []Continuous   [] Pulsed                           []1MHz   []3MHz W/cm2:  Location:    []  Iontophoresis with dexamethasone         Location: [] Take home patch   [] In clinic   10 [x]  Ice     []  heat  []  Ice massage  []  Laser   []  Anodyne Position: elevated supine  Location: left knee    []  Laser with stim  []  Other:  Position:  Location:    []  Vasopneumatic Device Pressure:       [] lo [] med [] hi   Temperature: [] lo [] med [] hi   [] Skin assessment post-treatment:  [x]intact []redness- no adverse reaction    []redness  adverse reaction:     28 min Therapeutic Exercise:  [x] See flow sheet :   Rationale: increase ROM and increase strength to improve the patients ability to perform ADLs    10 min Therapeutic Activity:  [x]  See flow sheet :   Rationale: increase strength and improve coordination  to improve the patients ability to improve mobility within his home    8 min Gait Training: In parallel bars forward/lateral/retro with verbal cues and tactile cues for increased weight bearing and terminal knee extension   Rationale: increase weight bearing to facilitate gait with           With   [] TE   [] TA   [] neuro   [] other: Patient Education: [x] Review HEP    [] Progressed/Changed HEP based on:   [] positioning   [] body mechanics   [] transfers   [] heat/ice application    [] other:      Other Objective/Functional Measures:   Left knee AAROM flexion = 90degrees  Mid patellar girth: right = 33.5cm, left = 36.7cm     Pain Level (0-10 scale) post treatment: 3    ASSESSMENT/Changes in Function: Pt continues to demonstrate significant joint effusion, producing inhibition and significant extensor lag with SLR. Pt performing all exercises without brace today; pt continues to demonstrate apprehension and decreased weight bearing during ambulation requiring significant tactile facilitate to increase distribution. Pt improving but remaining reduced by the end of treatment. Patient will continue to benefit from skilled PT services to modify and progress therapeutic interventions, address functional mobility deficits, address ROM deficits, address strength deficits, analyze and address soft tissue restrictions, analyze and cue movement patterns, analyze and modify body mechanics/ergonomics, assess and modify postural abnormalities and address imbalance/dizziness to attain remaining goals.      []  See Plan of Care  []  See progress note/recertification  []  See Discharge Summary         Progress towards goals / Updated goals:  Short Term Goals: To be accomplished in 1 weeks:  1. Establish HEP for ROM and strengthening  Eval: established and reviewed; MD contacted for clarification of wt bearing status and use of immobilizer (currently following ER recommendations)              MET     Long Term Goals: To be accomplished in 10 treatments:  1. Patient will be independent with HEP for ROM and strengthening              Eval: established              Current: reports compliance  2. Patient will demonstrate 0-120 degrees AROM left knee to increase ease of ADLs              Eval: 25-65 with pain and guarding              Current: AAROM flexion = 90deg  3. Patient will increase FOTO > 25 pts to increase functional mobility and facilitate return to work              Eval: 26              Current: to be reassessed at [address]  4.  Patient will ascend and descend steps with reciprocal pattern to increase ease of entry into home and bedroom              Eval: step to or bumping up as able              Current: gait training with progressive weight bearing; improving reciprocal pattern with cuing     PLAN  [x]  Upgrade activities as tolerated     [x]  Continue plan of care  []  Update interventions per flow sheet       []  Discharge due to:_  []  Other:_      Gypsy Davila, PT 11/8/2019  3:54 PM    Future Appointments   Date Time Provider Kal Juárez   11/11/2019  8:30 AM [facility]   11/13/2019 11:30 AM [facility]   11/15/2019  8:30 AM [facility]

## 2019-11-11 ENCOUNTER — HOSPITAL ENCOUNTER (OUTPATIENT)
Dept: PHYSICAL THERAPY | Age: 24
Discharge: HOME | End: 2019-11-11
Payer: COMMERCIAL

## 2019-11-11 PROCEDURE — 97110 THERAPEUTIC EXERCISES: CPT

## 2019-11-11 PROCEDURE — 97530 THERAPEUTIC ACTIVITIES: CPT

## 2019-11-11 NOTE — PROGRESS NOTES
PT DAILY TREATMENT NOTE 10-18    Patient Name: Mickiel Meckel  Date:2019  : 1995  [x]  Patient  Verified  Payor: BLUE CROSS / Plan: [facility] [place] / Product Type: PPO /    In time:2:35  Out time:3:27  Total Treatment Time (min): 52  Visit #: 5 of 10    Medicare/BCBS Only   Total Timed Codes (min):  42 1:1 Treatment Time:  40       Treatment Area: Pain in left knee [M25.562]    SUBJECTIVE  Pain Level (0-10 scale): 0  Any medication changes, allergies to medications, adverse drug reactions, diagnosis change, or new procedure performed?: [x] No    [] Yes (see summary sheet for update)  Subjective functional status/changes:   [] No changes reported  Pt reports feeling better today    OBJECTIVE    Modality rationale: decrease inflammation and decrease pain to improve the patients ability to perform daily tasks   Min Type Additional Details    [] Estim:  []Unatt       []IFC  []Premod                        []Other:  []w/ice   []w/heat  Position:  Location:    [] Estim: []Att    []TENS instruct  []NMES                    []Other:  []w/US   []w/ice   []w/heat  Position:  Location:    []  Traction: [] Cervical       []Lumbar                       [] Prone          []Supine                       []Intermittent   []Continuous Lbs:  [] before manual  [] after manual    []  Ultrasound: []Continuous   [] Pulsed                           []1MHz   []3MHz W/cm2:  Location:    []  Iontophoresis with dexamethasone         Location: [] Take home patch   [] In clinic   10 [x]  Ice     []  heat  []  Ice massage  []  Laser   []  Anodyne Position: elevated supine   Location: left knee    []  Laser with stim  []  Other:  Position:  Location:    []  Vasopneumatic Device Pressure:       [] lo [] med [] hi   Temperature: [] lo [] med [] hi   [] Skin assessment post-treatment:  []intact []redness- no adverse reaction    []redness  adverse reaction:       32 min Therapeutic Exercise:  [x] See flow sheet :   Rationale: increase ROM and increase strength to improve the patients ability to perform ADLs    10 min Therapeutic Activity:  [x]  See flow sheet : lateral, fwd, retro amb in parallel bars with only occasional fingertip touch   Rationale: increase strength, improve balance and increase proprioception  to improve the patients ability to perform functional tasks         With   [] TE   [] TA   [] neuro   [] other: Patient Education: [x] Review HEP    [] Progressed/Changed HEP based on:   [] positioning   [] body mechanics   [] transfers   [] heat/ice application    [] other:      Other Objective/Functional Measures:   Occasional use of fingertip touch in parallel bars with fwd/lat/retro amb   Mid patellar girth left 36.3    Pain Level (0-10 scale) post treatment: 0    ASSESSMENT/Changes in Function: Cont's with ext lag with SLR. Reports of pain with end range AROM knee flex. Vc's for heel strike and TKE while amb in parallel bars. Patient will continue to benefit from skilled PT services to modify and progress therapeutic interventions, address functional mobility deficits, address ROM deficits, address strength deficits, analyze and address soft tissue restrictions, analyze and cue movement patterns, analyze and modify body mechanics/ergonomics and address imbalance/dizziness to attain remaining goals. []  See Plan of Care  []  See progress note/recertification  []  See Discharge Summary         Progress towards goals / Updated goals:  Short Term Goals: To be accomplished in 1 weeks:  1. Establish HEP for ROM and strengthening  Eval: established and reviewed; MD contacted for clarification of wt bearing status and use of immobilizer (currently following ER recommendations)              MET     Long Term Goals: To be accomplished in 10 treatments:  1. Patient will be independent with HEP for ROM and strengthening              Eval: established              Current: reports compliance  2.  Patient will demonstrate 0-120 degrees AROM left knee to increase ease of ADLs              Eval: 25-65 with pain and guarding              Current: AAROM flexion = 90deg  3. Patient will increase FOTO > 25 pts to increase functional mobility and facilitate return to work              Eval: 26              Current: to be reassessed at [address]  4.  Patient will ascend and descend steps with reciprocal pattern to increase ease of entry into home and bedroom              Eval: step to or bumping up as able              Current: gait training with progressive weight bearing; improving reciprocal pattern with cuing     PLAN  []  Upgrade activities as tolerated     [x]  Continue plan of care  []  Update interventions per flow sheet       []  Discharge due to:_  []  Other:_      Braden Caballero PTA 11/11/2019  2:46 PM    Future Appointments   Date Time Provider Kal Juárez   11/13/2019 11:30 AM [facility]   11/15/2019  8:30 AM [facility]

## 2019-11-13 ENCOUNTER — HOSPITAL ENCOUNTER (OUTPATIENT)
Dept: PHYSICAL THERAPY | Age: 24
Discharge: HOME | End: 2019-11-13
Payer: COMMERCIAL

## 2019-11-13 PROCEDURE — 97530 THERAPEUTIC ACTIVITIES: CPT

## 2019-11-13 PROCEDURE — 97110 THERAPEUTIC EXERCISES: CPT

## 2019-11-13 NOTE — PROGRESS NOTES
PT DAILY TREATMENT NOTE 10-18    Patient Name: Mynor Art  Date:2019  : 1995  [x]  Patient  Verified  Payor: BLUE CROSS / Plan: [employer] [facility] [place] / Product Type: PPO /    In time:1136  Out time:1226  Total Treatment Time (min): 50  Visit #: 6 of 10    Medicare/BCBS Only   Total Timed Codes (min):  40 1:1 Treatment Time:  40       Treatment Area: Pain in left knee [M25.562]    SUBJECTIVE  Pain Level (0-10 scale): 0  Any medication changes, allergies to medications, adverse drug reactions, diagnosis change, or new procedure performed?: [x] No    [] Yes (see summary sheet for update)  Subjective functional status/changes:   [] No changes reported  \"No pain. \"    OBJECTIVE    Modality rationale: decrease inflammation to improve the patients ability to improve mobility and gait   Min Type Additional Details    [] Estim:  []Unatt       []IFC  []Premod                        []Other:  []w/ice   []w/heat  Position:  Location:    [] Estim: []Att    []TENS instruct  []NMES                    []Other:  []w/US   []w/ice   []w/heat  Position:  Location:    []  Traction: [] Cervical       []Lumbar                       [] Prone          []Supine                       []Intermittent   []Continuous Lbs:  [] before manual  [] after manual    []  Ultrasound: []Continuous   [] Pulsed                           []1MHz   []3MHz W/cm2:  Location:    []  Iontophoresis with dexamethasone         Location: [] Take home patch   [] In clinic   10 [x]  Ice     []  heat  []  Ice massage  []  Laser   []  Anodyne Position: supine with wedge   Location: left knee    []  Laser with stim  []  Other:  Position:  Location:    []  Vasopneumatic Device Pressure:       [] lo [] med [] hi   Temperature: [] lo [] med [] hi   [x] Skin assessment post-treatment:  [x]intact []redness- no adverse reaction    []redness  adverse reaction:     15 min Therapeutic Exercise:  [x] See flow sheet :   Rationale: increase ROM and increase strength to improve the patients ability to perform ADLs    25 min Therapeutic Activity:  [x]  See flow sheet : weight shifting, gait sequencing   Rationale: increase ROM, increase strength, improve coordination, improve balance and increase proprioception  to improve the patients ability to improve mobility and gait        With   [x] TE   [x] TA   [] neuro   [] other: Patient Education: [x] Review HEP    [] Progressed/Changed HEP based on:   [x] positioning   [x] body mechanics   [] transfers   [] heat/ice application    [] other:      Other Objective/Functional Measures:      Pain Level (0-10 scale) post treatment: 0    ASSESSMENT/Changes in Function: Pt is making progress with his weight shifting and weight bearing. He is performing gait with a single axillary crutch on the right. He continues to have an extensor lag with SLR, but is improving. He did well with progression of exercises. Patient will continue to benefit from skilled PT services to modify and progress therapeutic interventions, address functional mobility deficits, address ROM deficits, address strength deficits, analyze and address soft tissue restrictions, analyze and cue movement patterns, analyze and modify body mechanics/ergonomics, assess and modify postural abnormalities, address imbalance/dizziness and instruct in home and community integration to attain remaining goals. [x]  See Plan of Care  []  See progress note/recertification  []  See Discharge Summary         Progress towards goals / Updated goals:  Short Term Goals: To be accomplished in 1 weeks:  1. Establish HEP for ROM and strengthening  Eval: established and reviewed; MD contacted for clarification of wt bearing status and use of immobilizer (currently following ER recommendations)              MET     Long Term Goals: To be accomplished in 10 treatments:  1.  Patient will be independent with HEP for ROM and strengthening              Eval: established              Current: reports compliance  2. Patient will demonstrate 0-120 degrees AROM left knee to increase ease of ADLs              Eval: 25-65 with pain and guarding              Current: AAROM flexion = 90deg  3. Patient will increase FOTO > 25 pts to increase functional mobility and facilitate return to work              Eval: 26              Current: to be reassessed at [address]  4.  Patient will ascend and descend steps with reciprocal pattern to increase ease of entry into home and bedroom              Eval: step to or bumping up as able              Current: gait training with progressive weight bearing; improving reciprocal pattern with cuing     PLAN  []  Upgrade activities as tolerated     [x]  Continue plan of care  []  Update interventions per flow sheet       []  Discharge due to:_  []  Other:_      Ethan Loja, PTA, CSCS 11/13/2019  12:31 PM    Future Appointments   Date Time Provider Kal Juárez   11/15/2019  8:30 AM [facility] [address]

## 2019-11-15 ENCOUNTER — HOSPITAL ENCOUNTER (OUTPATIENT)
Dept: PHYSICAL THERAPY | Age: 24
Discharge: HOME | End: 2019-11-15
Payer: COMMERCIAL

## 2019-11-15 ENCOUNTER — DOCUMENTATION ONLY (OUTPATIENT)
Dept: ORTHOPEDIC SURGERY | Facility: CLINIC | Age: 24
End: 2019-11-15

## 2019-11-15 PROCEDURE — 97110 THERAPEUTIC EXERCISES: CPT

## 2019-11-15 PROCEDURE — 97112 NEUROMUSCULAR REEDUCATION: CPT

## 2019-11-15 NOTE — PROGRESS NOTES
Return to Work Certification form filled out and faxed as requested. Confirmation received. Copy made for scanning. origninal placed in forms file at [place] [id number]. Patient called and notified.

## 2019-11-15 NOTE — PROGRESS NOTES
PT DAILY TREATMENT NOTE 10-18    Patient Name: Audra Redd  Date:11/15/2019  : 1995  [x]  Patient  Verified  Payor: BLUE CROSS / Plan: [employer] [facility] [place] / Product Type: PPO /    In time:841  Out time:937  Total Treatment Time (min): 56  Visit #: 7 of 10    Medicare/BCBS Only   Total Timed Codes (min):  46 1:1 Treatment Time:  35       Treatment Area: Pain in left knee [M25.562]    SUBJECTIVE  Pain Level (0-10 scale): 0  Any medication changes, allergies to medications, adverse drug reactions, diagnosis change, or new procedure performed?: [x] No    [] Yes (see summary sheet for update)  Subjective functional status/changes:   [] No changes reported  Pt has been working on [employer]    OBJECTIVE    Modality rationale: decrease edema and increase muscle contraction/control to improve the patients ability to perform SLR and facilitate DC of brace   Min Type Additional Details    [] Estim:  []Unatt       []IFC  []Premod                        []Other:  []w/ice   []w/heat  Position:  Location:    (billed in [facility]) [] Estim: []Att    []TENS instruct  [x]NMES                    []Other:  []w/US   []w/ice   []w/heat  Position: supine  Location: left quad     []  Traction: [] Cervical       []Lumbar                       [] Prone          []Supine                       []Intermittent   []Continuous Lbs:  [] before manual  [] after manual    []  Ultrasound: []Continuous   [] Pulsed                           []1MHz   []3MHz W/cm2:  Location:    []  Iontophoresis with dexamethasone         Location: [] Take home patch   [] In clinic   10 [x]  Ice     []  heat  []  Ice massage  []  Laser   []  Anodyne Position: elevated supine  Location: left knee    []  Laser with stim  []  Other:  Position:  Location:    []  Vasopneumatic Device Pressure:       [] lo [] med [] hi   Temperature: [] lo [] med [] hi   [] Skin assessment post-treatment:  [x]intact []redness- no adverse reaction    []redness  adverse reaction:     38 min Therapeutic Exercise:  [x] See flow sheet :   Rationale: increase ROM and increase strength to improve the patients ability to ambulate without AD    8 min Neuromuscular Re-education:  [x]  See flow sheet : NMES with tactile cues and performance of quad set and SLR   Rationale: increase strength and improve coordination  to improve the patients ability to perform SLR without extensor lag          With   [] TE   [] TA   [] neuro   [] other: Patient Education: [x] Review HEP    [] Progressed/Changed HEP based on:   [] positioning   [] body mechanics   [] transfers   [] heat/ice application    [] other:      Other Objective/Functional Measures: left knee AROM flexion = 98degrees     Pain Level (0-10 scale) post treatment: 0    ASSESSMENT/Changes in Function: Initiated NMES for quad neuromuscular re-education due to pt persistent extensor lag with SLR. Pt continued to have lag with application of NMEs. Pt ROM is progressing slowly secondary to \"tightness\" and effusion. Patient will continue to benefit from skilled PT services to modify and progress therapeutic interventions, address functional mobility deficits, address ROM deficits, address strength deficits, analyze and address soft tissue restrictions, analyze and cue movement patterns, analyze and modify body mechanics/ergonomics and assess and modify postural abnormalities to attain remaining goals. []  See Plan of Care  []  See progress note/recertification  []  See Discharge Summary         Progress towards goals / Updated goals:  Short Term Goals: To be accomplished in 1 weeks:  1. Establish HEP for ROM and strengthening  Eval: established and reviewed; MD contacted for clarification of wt bearing status and use of immobilizer (currently following ER recommendations)              MET     Long Term Goals: To be accomplished in 10 treatments:  1.  Patient will be independent with HEP for ROM and strengthening              Eval: established              Current: reports compliance  2. Patient will demonstrate 0-120 degrees AROM left knee to increase ease of ADLs              Eval: 25-65 with pain and guarding              Current: AROM flexion = 98deg  3. Patient will increase FOTO > 25 pts to increase functional mobility and facilitate return to work              Eval: 26              Current: to be reassessed at [address]  4.  Patient will ascend and descend steps with reciprocal pattern to increase ease of entry into home and bedroom              Eval: step to or bumping up as able              Current: gait training with progressive weight bearing; improving reciprocal pattern with cuing      PLAN  [x]  Upgrade activities as tolerated     [x]  Continue plan of care  []  Update interventions per flow sheet       []  Discharge due to:_  []  Other:_      Kasi Hanks, PT 11/15/2019  10:43 AM    Future Appointments   Date Time Provider Kal Juárez   11/18/2019 11:30 AM [facility]   11/20/2019 11:30 AM [facility] E Lund [facility]

## 2019-11-18 ENCOUNTER — APPOINTMENT (OUTPATIENT)
Dept: PHYSICAL THERAPY | Age: 24
End: 2019-11-18
Payer: COMMERCIAL

## 2019-11-19 ENCOUNTER — HOSPITAL ENCOUNTER (OUTPATIENT)
Dept: PHYSICAL THERAPY | Age: 24
Discharge: HOME | End: 2019-11-19
Payer: COMMERCIAL

## 2019-11-19 PROCEDURE — 97112 NEUROMUSCULAR REEDUCATION: CPT

## 2019-11-19 NOTE — PROGRESS NOTES
PT DAILY TREATMENT NOTE 10-18    Patient Name: Layo Parry  Date:2019  : 1995  [x]  Patient  Verified  Payor: BLUE CROSS / Plan: [facility] / Product Type: PPO /    In time:937  Out time:1024  Total Treatment Time (min): 47  Visit #: 8 of 10    Medicare/BCBS Only   Total Timed Codes (min):  37 1:1 Treatment Time:  8       Treatment Area: Pain in left knee [M25.562]    SUBJECTIVE  Pain Level (0-10 scale): 0  Any medication changes, allergies to medications, adverse drug reactions, diagnosis change, or new procedure performed?: [x] No    [] Yes (see summary sheet for update)  Subjective functional status/changes:   [] No changes reported  \"No pain. \"    OBJECTIVE    Modality rationale: decrease inflammation and decrease pain to improve the patients ability to improve mobility and gait   Min Type Additional Details    [] Estim:  []Unatt       []IFC  []Premod                        []Other:  []w/ice   []w/heat  Position:  Location:    [] Estim: []Att    []TENS instruct  []NMES                    []Other:  []w/US   []w/ice   []w/heat  Position:  Location:    []  Traction: [] Cervical       []Lumbar                       [] Prone          []Supine                       []Intermittent   []Continuous Lbs:  [] before manual  [] after manual    []  Ultrasound: []Continuous   [] Pulsed                           []1MHz   []3MHz W/cm2:  Location:    []  Iontophoresis with dexamethasone         Location: [] Take home patch   [] In clinic   10 [x]  Ice     []  heat  []  Ice massage  []  Laser   []  Anodyne Position: supine with wedge    Location: left knee    []  Laser with stim  []  Other:  Position:  Location:    []  Vasopneumatic Device Pressure:       [] lo [] med [] hi   Temperature: [] lo [] med [] hi   [x] Skin assessment post-treatment:  [x]intact []redness- no adverse reaction    []redness  adverse reaction:     10 min Therapeutic Exercise:  [x] See flow sheet :   Rationale: increase ROM and increase strength to improve the patients ability to perform ADLs    27 min Neuromuscular Re-education:  [x]  See flow sheet : quad re-ed activities   Rationale: increase ROM, increase strength, improve coordination, improve balance and increase proprioception  to improve the patients ability to improve mobility, stance stability, and gait        With   [x] TE   [] TA   [x] neuro   [] other: Patient Education: [x] Review HEP    [] Progressed/Changed HEP based on:   [x] positioning   [x] body mechanics   [] transfers   [] heat/ice application    [] other:      Other Objective/Functional Measures:      Pain Level (0-10 scale) post treatment: 0    ASSESSMENT/Changes in Function: Pt is making progress with his weight shifting and gait without an AD, but still has minimal knee flexion during gait and poor heel strike. We will resume [place] estim at [place]. Patient will continue to benefit from skilled PT services to modify and progress therapeutic interventions, address functional mobility deficits, address ROM deficits, address strength deficits, analyze and address soft tissue restrictions, analyze and cue movement patterns, analyze and modify body mechanics/ergonomics, assess and modify postural abnormalities, address imbalance/dizziness and instruct in home and community integration to attain remaining goals. [x]  See Plan of Care  []  See progress note/recertification  []  See Discharge Summary         Progress towards goals / Updated goals:  Short Term Goals: To be accomplished in 1 weeks:  1. Establish HEP for ROM and strengthening  Eval: established and reviewed; MD contacted for clarification of wt bearing status and use of immobilizer (currently following ER recommendations)              MET     Long Term Goals: To be accomplished in 10 treatments:  1. Patient will be independent with HEP for ROM and strengthening              Eval: established              Current: reports compliance  2.  Patient will demonstrate 0-120 degrees AROM left knee to increase ease of ADLs              Eval: 25-65 with pain and guarding              Current: AROM flexion = 98deg  3. Patient will increase FOTO > 25 pts to increase functional mobility and facilitate return to work              Eval: 26              Current: to be reassessed at [address]  4.  Patient will ascend and descend steps with reciprocal pattern to increase ease of entry into home and bedroom              Eval: step to or bumping up as able              Current: gait training with progressive weight bearing; improving reciprocal pattern with cuing        PLAN  []  Upgrade activities as tolerated     [x]  Continue plan of care  []  Update interventions per flow sheet       []  Discharge due to:_  []  Other:_      Valerie Smith, PTA, CSCS 11/19/2019  10:29 AM    Future Appointments   Date Time Provider Kal Juárez   11/20/2019 11:30 AM Elizabeth Clifford PT [facility]

## 2019-11-20 ENCOUNTER — HOSPITAL ENCOUNTER (OUTPATIENT)
Dept: PHYSICAL THERAPY | Age: 24
Discharge: HOME | End: 2019-11-20
Payer: COMMERCIAL

## 2019-11-20 PROCEDURE — 97110 THERAPEUTIC EXERCISES: CPT

## 2019-11-20 PROCEDURE — 97112 NEUROMUSCULAR REEDUCATION: CPT

## 2019-11-20 NOTE — PROGRESS NOTES
PT DAILY TREATMENT NOTE 10-18    Patient Name: Sam Fonseca  Date:2019  : 1995  [x]  Patient  Verified  Payor: BLUE CROSS / Plan: [employer] [facility] / Product Type: PPO /    In time:1135  Out time:1233  Total Treatment Time (min): 58  Visit #: 9 of 10    Medicare/BCBS Only   Total Timed Codes (min):  53 1:1 Treatment Time:  48       Treatment Area: Pain in left knee [M25.562]    SUBJECTIVE  Pain Level (0-10 scale): 0  Any medication changes, allergies to medications, adverse drug reactions, diagnosis change, or new procedure performed?: [x] No    [] Yes (see summary sheet for update)  Subjective functional status/changes:   [] No changes reported  \"I'm doing fine, no pain. \"    OBJECTIVE    Modality rationale: decrease edema, decrease inflammation, decrease pain and increase muscle contraction/control to improve the patients ability to improve ease with gait. Min Type Additional Details    [] Estim:  []Unatt       []IFC  []Premod                        []Other:  []w/ice   []w/heat  Position:  Location:         []  Traction: [] Cervical       []Lumbar                       [] Prone          []Supine                       []Intermittent   []Continuous Lbs:  [] before manual  [] after manual    []  Ultrasound: []Continuous   [] Pulsed                           []1MHz   []3MHz W/cm2:  Location:    []  Iontophoresis with dexamethasone         Location: [] Take home patch   [] In clinic   5 [x]  Ice     []  heat  []  Ice massage  []  Laser   []  Anodyne Position:supine with wedge  Location:left knee    []  Laser with stim  []  Other:  Position:  Location:    []  Vasopneumatic Device Pressure:       [] lo [] med [] hi   Temperature: [] lo [] med [] hi   [x] Skin assessment post-treatment:  [x]intact []redness- no adverse reaction    []redness  adverse reaction:   23 min Therapeutic Exercise:  [x]?  See flow sheet :   Rationale: increase ROM and increase strength to improve the patients ability to perform ADLs     30 min Neuromuscular Re-education:  [x]? See flow sheet : quad re-ed activities    Quad re-ed using Symmetric biphasic waveform (300 microseconds/25 hz) - 4/12 second on/off time (quad set x 2 min and then SLR with A x 3 min)   Rationale: increase ROM, increase strength, improve coordination, improve balance and increase proprioception  to improve the patients ability to improve mobility, stance stability, and gait          With   [] TE   [] TA   [] neuro   [] other: Patient Education: [x] Review HEP    [] Progressed/Changed HEP based on:   [] positioning   [] body mechanics   [] transfers   [] heat/ice application    [] other:      Other Objective/Functional Measures:      Pain Level (0-10 scale) post treatment: 0    ASSESSMENT/Changes in Function: Mr. Jim Arora did well with progression of quad stability exercises without increase in pain. Cuing still for quad control and proper activation with SLR to avoid severe lag. Still lags a bit when fatigued. Patient will continue to benefit from skilled PT services to modify and progress therapeutic interventions, address functional mobility deficits, address ROM deficits, address strength deficits, analyze and address soft tissue restrictions, analyze and cue movement patterns, analyze and modify body mechanics/ergonomics, assess and modify postural abnormalities, address imbalance/dizziness and instruct in home and community integration to attain remaining goals. []  See Plan of Care  []  See progress note/recertification  []  See Discharge Summary         Progress towards goals / Updated goals:  Short Term Goals: To be accomplished in 1 weeks:  1. Establish HEP for ROM and strengthening  Eval: established and reviewed; MD contacted for clarification of wt bearing status and use of immobilizer (currently following ER recommendations)              MET     Long Term Goals: To be accomplished in 10 treatments:  1.  Patient will be independent with HEP for ROM and strengthening              Eval: established              [id number]: reports compliance  2. Patient will demonstrate 0-120 degrees AROM left knee to increase ease of ADLs              Eval: 25-65 with pain and guarding              Current: AROM flexion = 98deg  3. Patient will increase FOTO > 25 pts to increase functional mobility and facilitate return to work              Eval: 26              Current: to be reassessed at [address]  4. Patient will ascend and descend steps with reciprocal pattern to increase ease of entry into home and bedroom              Eval: step to or bumping up as able              Current: gait training with progressive weight bearing; improving reciprocal pattern with cuing         PLAN  []  Upgrade activities as tolerated     [x]  Continue plan of care  []  Update interventions per flow sheet       []  Discharge due to:_  []  Other:_      Viky Garcia, PT 11/20/2019  11:48 AM    No future appointments.

## 2019-11-22 ENCOUNTER — HOSPITAL ENCOUNTER (OUTPATIENT)
Dept: PHYSICAL THERAPY | Age: 24
Discharge: HOME | End: 2019-11-22
Payer: COMMERCIAL

## 2019-11-22 PROCEDURE — 97112 NEUROMUSCULAR REEDUCATION: CPT

## 2019-11-22 NOTE — PROGRESS NOTES
PT DAILY TREATMENT NOTE 10-18    Patient Name: Alberto Form  Date:2019  : 1995  [x]  Patient  Verified  Payor: BLUE CROSS / Plan: [employer] [facility] / Product Type: PPO /    In time:1033  Out time:1119  Total Treatment Time (min): 46  Visit #: 10 of 10    Medicare/BCBS Only   Total Timed Codes (min):  36 1:1 Treatment Time:  33       Treatment Area: Pain in left knee [M25.562]    SUBJECTIVE  Pain Level (0-10 scale): 0  Any medication changes, allergies to medications, adverse drug reactions, diagnosis change, or new procedure performed?: [x] No    [] Yes (see summary sheet for update)  Subjective functional status/changes:   [] No changes reported  \"No pain. \"    OBJECTIVE    Modality rationale: decrease inflammation to improve the patients ability to improve mobility and gait   Min Type Additional Details    [] Estim:  []Unatt       []IFC  []Premod                        []Other:  []w/ice   []w/heat  Position:  Location:    [] Estim: []Att    []TENS instruct  []NMES                    []Other:  []w/US   []w/ice   []w/heat  Position:  Location:    []  Traction: [] Cervical       []Lumbar                       [] Prone          []Supine                       []Intermittent   []Continuous Lbs:  [] before manual  [] after manual    []  Ultrasound: []Continuous   [] Pulsed                           []1MHz   []3MHz W/cm2:  Location:    []  Iontophoresis with dexamethasone         Location: [] Take home patch   [] In clinic   10 [x]  Ice     []  heat  []  Ice massage  []  Laser   []  Anodyne Position: supine with wedge   Location: left knee    []  Laser with stim  []  Other:  Position:  Location:    []  Vasopneumatic Device Pressure:       [] lo [] med [] hi   Temperature: [] lo [] med [] hi   [x] Skin assessment post-treatment:  [x]intact []redness- no adverse reaction    []redness  adverse reaction:     10 min Therapeutic Exercise:  [x] See flow sheet :   Rationale: increase ROM and increase strength to improve the patients ability to perform ADLs    26 min Neuromuscular Re-education:  [x]  See flow sheet : quad re-ed activities   Rationale: increase ROM, increase strength, improve coordination, improve balance and increase proprioception  to improve the patients ability to improve mobility, stance stability, and gait        With   [x] TE   [] TA   [x] neuro   [] other: Patient Education: [x] Review HEP    [] Progressed/Changed HEP based on:   [x] positioning   [x] body mechanics   [] transfers   [] heat/ice application    [] other:      Other Objective/Functional Measures:   FOTO 57    AROM left knee 0-102 deg     Pain Level (0-10 scale) post treatment: 0    ASSESSMENT/Changes in Function: Mr. Niki Cam has been a pleasure to treat and reports 70% improvement since beginning therapy. Pt reports ease with walking/standing tolerance and confidence without crutch and brace. He states that he has better ROM and strength, but still lacking full strength. He has a slight lag with SLR, but much improved overall. He continues to ambulate with single crutch. His ROM is improving, but still has some limitations with end range flexion. We will continue with therapy to address his remaining functional deficits. Patient will continue to benefit from skilled PT services to modify and progress therapeutic interventions, address functional mobility deficits, address ROM deficits, address strength deficits, analyze and address soft tissue restrictions, analyze and cue movement patterns, analyze and modify body mechanics/ergonomics, assess and modify postural abnormalities, address imbalance/dizziness and instruct in home and community integration to attain remaining goals. [x]  See Plan of Care  [x]  See progress note/recertification  []  See Discharge Summary         Progress towards goals / Updated goals:  Short Term Goals: To be accomplished in 1 weeks:  1.  Establish HEP for ROM and strengthening  Eval: established and reviewed; MD contacted for clarification of wt bearing status and use of immobilizer (currently following ER recommendations)              MET     Long Term Goals: To be accomplished in 10 treatments:  1. Patient will be independent with HEP for ROM and strengthening              Eval: established              PROGRESSING; Reports compliance, will update as we progress  2. Patient will demonstrate 0-120 degrees AROM left knee to increase ease of ADLs              Eval: 25-65 with pain and guarding              PROGRESSING; 0-102 deg AROM left knee  3. Patient will increase FOTO > 25 pts to increase functional mobility and facilitate return to work              Eval: 26              PROGRESSING; 57  4. Patient will ascend and descend steps with reciprocal pattern to increase ease of entry into home and bedroom              Eval: step to or bumping up as able              PROGRESSING; able to perform stairs with non-reciprocal pattern      Functional Gains: walking/standing tolerance, strength, exercises, confidence, sleeping, ROM  Functional Deficits: full strength, mobility, normalized gait, reciprocal pattern with stairs  % improvement: 70%  Pain   Average: 0/10       Best: 0/10     Worst: 2/10  Patient Goal: \"get it back strong. \"    PLAN  []  Upgrade activities as tolerated     [x]  Continue plan of care  []  Update interventions per flow sheet       []  Discharge due to:_  []  Other:_      Leah Bethea, DAYAMI, CSCS 11/22/2019  11:21 AM    No future appointments.

## 2019-11-25 ENCOUNTER — HOSPITAL ENCOUNTER (OUTPATIENT)
Dept: PHYSICAL THERAPY | Age: 24
Discharge: HOME | End: 2019-11-25
Payer: COMMERCIAL

## 2019-11-25 PROCEDURE — 97110 THERAPEUTIC EXERCISES: CPT

## 2019-11-25 PROCEDURE — 97112 NEUROMUSCULAR REEDUCATION: CPT

## 2019-11-25 NOTE — PROGRESS NOTES
PT DAILY TREATMENT NOTE 10-18    Patient Name: Bard Vidales  Date:2019  : 1995  [x]  Patient  Verified  Payor: BLUE CROSS / Plan: [employer] [facility] / Product Type: PPO /    In time:1000  Out time:1058  Total Treatment Time (min): 62  Visit #: 1 of 8    Medicare/BCBS Only   Total Timed Codes (min):  48 1:1 Treatment Time:  38       Treatment Area: Pain in left knee [M25.562]    SUBJECTIVE  Pain Level (0-10 scale): 0  Any medication changes, allergies to medications, adverse drug reactions, diagnosis change, or new procedure performed?: [x] No    [] Yes (see summary sheet for update)  Subjective functional status/changes:   [] No changes reported  \"I don't have any pain. \"    OBJECTIVE    Modality rationale: Decrease inflammation to improve mobility and gait   Min Type Additional Details    [] Estim:  []Unatt       []IFC  []Premod                        []Other:  []w/ice   []w/heat  Position:   Location:     [] Estim: []Att    []TENS instruct  []NMES                    []Other:  []w/US   []w/ice   []w/heat  Position:  Location:    []  Traction: [] Cervical       []Lumbar                       [] Prone          []Supine                       []Intermittent   []Continuous Lbs:  [] before manual  [] after manual    []  Ultrasound: []Continuous   [] Pulsed                           []1MHz   []3MHz W/cm2:  Location:    []  Iontophoresis with dexamethasone         Location: [] Take home patch   [] In clinic   10 [x]  Ice     []  heat  []  Ice massage  []  Laser   []  Anodyne Position: supine with wedge   Location: left knee    []  Laser with stim  []  Other:  Position:  Location:    []  Vasopneumatic Device Pressure:       [] lo [] med [] hi   Temperature: [] lo [] med [] hi   [x] Skin assessment post-treatment:  [x]intact []redness- no adverse reaction    []redness  adverse reaction:     24 min Therapeutic Exercise:  [x] See flow sheet :   Rationale: increase ROM and increase strength to improve the patients ability to perform ADLss    24 min Neuromuscular Re-education:  [x]  See flow sheet : quad re-ed activities. [place] to left quad during SLR 4/12 on/off time for 5 minutes. Rationale: increase ROM, increase strength, improve coordination, improve balance and increase proprioception  to improve the patients ability to improve mobility, stance stability, and gait         With   [x] TE   [] TA   [x] neuro   [] other: Patient Education: [x] Review HEP    [] Progressed/Changed HEP based on:   [] positioning   [] body mechanics   [] transfers   [] heat/ice application    [] other:      Other Objective/Functional Measures:      Pain Level (0-10 scale) post treatment: 0    ASSESSMENT/Changes in Function: Pt continues to make steady progress with his quad activation, but still has a slight extensor lag with SLR. Continued improvement with weight bearing and gait without a crutch or brace. Patient will continue to benefit from skilled PT services to modify and progress therapeutic interventions, address functional mobility deficits, address ROM deficits, address strength deficits, analyze and address soft tissue restrictions, analyze and cue movement patterns, analyze and modify body mechanics/ergonomics, assess and modify postural abnormalities, address imbalance/dizziness and instruct in home and community integration to attain remaining goals. [x]  See Plan of Care  []  See progress note/recertification  []  See Discharge Summary         Progress towards goals / Updated goals:  1. Patient will be independent with HEP for ROM and strengthening              PN status: PROGRESSING; Reports compliance, will update as we progress  2. Patient will demonstrate 0-120 degrees AROM left knee to increase ease of ADLs              PN status: PROGRESSING; 0-102 deg AROM left knee  3.  Patient will increase FOTO to at least 60 to increase functional mobility and facilitate return to work              PN status: PROGRESSING; 57  4.  Patient will ascend and descend steps with reciprocal pattern to increase ease of entry into home and bedroom              PN status: PROGRESSING; able to perform stairs with non-reciprocal pattern     PLAN  []  Upgrade activities as tolerated     [x]  Continue plan of care  []  Update interventions per flow sheet       []  Discharge due to:_  []  Other:_      Be Flood, DAYAMI, CSCS 11/25/2019  11:03 AM    Future Appointments   Date Time Provider Kal Juárez   11/26/2019  3:00 PM Ilene Stewart PTA [facility]   12/2/2019  9:00 AM Ilene Stewart PTA [facility]   12/5/2019  9:00 AM Ilene Stewart PTA [facility]   12/9/2019  9:00 AM Ilene Stewart PTA [facility]   12/12/2019  9:00 AM Ilene Stewart PTA [facility]   12/16/2019  9:00 AM Christine Rivas, PT [facility]   12/19/2019  9:00 AM Ilene Stewart PTA [facility]

## 2019-11-25 NOTE — PROGRESS NOTES
[facility] [address]  [address] [facility] [address], [address] [phone] fax    Progress Note  Patient name: Mynor Art Start of Care: 10/31/2019   Referral source: Rose Barker MD : 1995                Medical Diagnosis: Pain in left knee [M25.562]  Payor: BLUE CROSS / Plan: [facility] / Product Type: PPO /  Onset Date:10/22/19                Treatment Diagnosis: left knee pain   Prior Hospitalization: see medical history Provider#: [id number]   Medications: Verified on Patient summary List    Comorbidities: none reported   Prior Level of Function: independent, working FT at [employer], active with basketball     Visits from Start of Care: 10    Missed Visits: 2    Established Goals:   Short Term Goals: To be accomplished in 1 weeks:  1. Establish HEP for ROM and strengthening  Eval: established and reviewed; MD contacted for clarification of wt bearing status and use of immobilizer (currently following ER recommendations)              MET     Long Term Goals: To be accomplished in 10 treatments:  1. Patient will be independent with HEP for ROM and strengthening              Eval: established              PROGRESSING; Reports compliance, will update as we progress  2. Patient will demonstrate 0-120 degrees AROM left knee to increase ease of ADLs              Eval: 25-65 with pain and guarding              PROGRESSING; 0-102 deg AROM left knee  3. Patient will increase FOTO > 25 pts to increase functional mobility and facilitate return to work              Eval: 26              MET; 57  4.  Patient will ascend and descend steps with reciprocal pattern to increase ease of entry into home and bedroom              Eval: step to or bumping up as able              PROGRESSING; able to perform stairs with step-to gait pattern      Key Functional Changes:   Functional Gains: walking/standing tolerance, strength, exercises, confidence, sleeping, ROM  Functional Deficits: full strength, mobility, normalized gait, reciprocal pattern with stairs  % improvement: 70%  Pain   Average: 0/10                  Best: 0/10                Worst: 2/10  Patient Goal: \"get it back strong. \"    Updated Goals: to be achieved in 4 weeks:  1. Patient will be independent with HEP for ROM and strengthening              PN status: PROGRESSING; Reports compliance, will update as we progress  2. Patient will demonstrate 0-120 degrees AROM left knee to increase ease of ADLs              PN status: PROGRESSING; 0-102 deg AROM left knee  3. Patient will increase FOTO to at least 60 to increase functional mobility and facilitate return to work              PN status: PROGRESSING; 57  4. Patient will ascend and descend steps with reciprocal pattern to increase ease of entry into home and bedroom              PN status: PROGRESSING; able to perform stairs with non-reciprocal pattern     ASSESSMENT/RECOMMENDATIONS: Mr. Niki Cam has met his short term goal and one long term goal with at least 50% progress towards the remaining 4. Pt has improved his walking/standing tolerance and confidence without crutch and brace within the clinic. Pt has improved his ROM and strength, but continues to have an extensor lag with SLR preventing DC of brace and single axillary cructch. Pt also continues to lack end range flexion. We will continue with therapy to address his remaining functional deficits.      [x]Continue therapy per initial plan/protocol at a frequency of  2 x per week for 4 weeks  []Continue therapy with the following recommended changes:_____________________      _____________________________________________________________________  []Discontinue therapy progressing towards or have reached established goals  []Discontinue therapy due to lack of appreciable progress towards goals  []Discontinue therapy due to lack of attendance or compliance  []Await Physician's recommendations/decisions regarding therapy  []Other:________________________________________________________________    Thank you for this referral.    Clifford Roland, PT 11/25/2019 7:50 AM  NOTE TO PHYSICIAN:  PLEASE COMPLETE THE ORDERS BELOW AND   FAX TO [facility]: [phone]  If you are unable to process this request in 24 hours please contact our office: [phone]    []  I have read the above report and request that my patient continue as recommended. []  I have read the above report and request that my patient continue therapy with the following changes/special instructions:________________________________________  [] I have read the above report and request that my patient be discharged from therapy.     [de-identified] Signature:____________Date:_________TIME:________    [employer], Date and Time must be completed for valid certification **

## 2019-11-26 ENCOUNTER — HOSPITAL ENCOUNTER (OUTPATIENT)
Dept: PHYSICAL THERAPY | Age: 24
Discharge: HOME | End: 2019-11-26
Payer: COMMERCIAL

## 2019-11-26 PROCEDURE — 97110 THERAPEUTIC EXERCISES: CPT

## 2019-11-26 PROCEDURE — 97112 NEUROMUSCULAR REEDUCATION: CPT

## 2019-11-26 NOTE — PROGRESS NOTES
PT DAILY TREATMENT NOTE 10-18    Patient Name: Bard Vidales  Date:2019  : 1995  [x]  Patient  Verified  Payor: BLUE CROSS / Plan: [employer] [facility] / Product Type: PPO /    In time:300  Out time:358  Total Treatment Time (min): 58  Visit #: 2 of 8    Medicare/BCBS Only   Total Timed Codes (min):  58 1:1 Treatment Time:  62       Treatment Area: Pain in left knee [M25.562]    SUBJECTIVE  Pain Level (0-10 scale): 0  Any medication changes, allergies to medications, adverse drug reactions, diagnosis change, or new procedure performed?: [x] No    [] Yes (see summary sheet for update)  Subjective functional status/changes:   [] No changes reported  \"Not having pain. \"    OBJECTIVE    28 min Therapeutic Exercise:  [x]? See flow sheet :   Rationale: increase ROM and increase strength to improve the patients ability to perform ADLss     30 min Neuromuscular Re-education:  [x]? See flow sheet : quad re-ed activities. Portuguese to left quad during SLR  on/off time for 5 minutes. Rationale: increase ROM, increase strength, improve coordination, improve balance and increase proprioception  to improve the patients ability to improve mobility, stance stability, and gait            With   [] TE   [] TA   [] neuro   [] other: Patient Education: [x] Review HEP    [] Progressed/Changed HEP based on:   [] positioning   [] body mechanics   [] transfers   [] heat/ice application    [] other:      Other Objective/Functional Measures:      Pain Level (0-10 scale) post treatment: 0    ASSESSMENT/Changes in Function: Ms. Lin Campos did great with progression of single leg balance activities without increase in pain. Needs cuing for form with single leg RDLs. Able to perform straight leg raise without quad lag today.     Patient will continue to benefit from skilled PT services to modify and progress therapeutic interventions, address functional mobility deficits, address ROM deficits, address strength deficits, analyze and address soft tissue restrictions, analyze and cue movement patterns, analyze and modify body mechanics/ergonomics, assess and modify postural abnormalities, address imbalance/dizziness and instruct in home and community integration to attain remaining goals. []  See Plan of Care  []  See progress note/recertification  []  See Discharge Summary         Progress towards goals / Updated goals:  1. Patient will be independent with HEP for ROM and strengthening              PN status: PROGRESSING; Reports compliance, will update as we progress  2. Patient will demonstrate 0-120 degrees AROM left knee to increase ease of ADLs              PN status: PROGRESSING; 0-102 deg AROM left knee  3. Patient will increase FOTO to at least 60 to increase functional mobility and facilitate return to work              PN status: PROGRESSING; 57  4.  Patient will ascend and descend steps with reciprocal pattern to increase ease of entry into home and bedroom              PN status: PROGRESSING; able to perform stairs with non-reciprocal pattern        PLAN  []  Upgrade activities as tolerated     [x]  Continue plan of care  []  Update interventions per flow sheet       []  Discharge due to:_  []  Other:_      Armando Colunga, PT 11/26/2019  3:26 PM    Future Appointments   Date Time Provider Kal Juárez   12/2/2019  9:00 AM Patisara Hernandez, PTA [facility]   12/2/2019 10:15 AM Andrew Bajwa MD [facility]   12/5/2019  9:00 AM Patience Mary, PTA [facility]   12/9/2019  9:00 AM Patience Mary, PTA [facility]   12/12/2019  9:00 AM Patience Mary, PTA [facility]   12/16/2019  9:00 AM Danette Mosher PT [facility]   12/19/2019  9:00 AM Patisara Hernandez, PTA [facility]

## 2019-12-02 ENCOUNTER — HOSPITAL ENCOUNTER (OUTPATIENT)
Dept: PHYSICAL THERAPY | Age: 24
Discharge: HOME | End: 2019-12-02
Payer: COMMERCIAL

## 2019-12-02 ENCOUNTER — OFFICE VISIT (OUTPATIENT)
Dept: ORTHOPEDIC SURGERY | Facility: CLINIC | Age: 24
End: 2019-12-02

## 2019-12-02 VITALS
DIASTOLIC BLOOD PRESSURE: 73 MMHG | WEIGHT: 180.4 LBS | OXYGEN SATURATION: 99 % | TEMPERATURE: 97.9 F | RESPIRATION RATE: 18 BRPM | SYSTOLIC BLOOD PRESSURE: 118 MMHG | HEIGHT: 70 IN | HEART RATE: 82 BPM | BODY MASS INDEX: 25.83 KG/M2

## 2019-12-02 DIAGNOSIS — S83.005D DISLOCATION OF LEFT PATELLA, SUBSEQUENT ENCOUNTER: Primary | ICD-10-CM

## 2019-12-02 DIAGNOSIS — M25.562 ACUTE PAIN OF LEFT KNEE: ICD-10-CM

## 2019-12-02 PROCEDURE — 97110 THERAPEUTIC EXERCISES: CPT

## 2019-12-02 PROCEDURE — 97112 NEUROMUSCULAR REEDUCATION: CPT

## 2019-12-02 NOTE — PROGRESS NOTES
Patient: Mendel Smith                MRN: [id number]       SSN: [id number]  YOB: 1995        AGE: 27 y.o. SEX: male    PCP: Jake Wells MD  12/02/19    Chief Complaint   Patient presents with    Knee Pain     Left     HISTORY:  Mendel Smith is a 27 y.o. male who sustained a left knee basketball injury on 10/22/19. He is feeling much better since his knee aspiration last OV--185 cc bloody fluid removed. PT has been helpful. He felt a sudden pain when he landed wrong when he went up for a rebound. He landed wrong as he collided with another player. Mr. Tyson Hdez brother reduced the obvious patella dislocation with leg extension and pressure applied. [facility] where left knee x rays revealed large effusion. He was provided a knee immobilizer, ibuprofen and orthopedic referral. He has been experiencing left knee pain and swelling since the injury. Pain Assessment  12/2/2019   Location of Pain Knee   Location Modifiers Left   Severity of Pain 0   Quality of Pain -   Duration of Pain -   Frequency of Pain -   Aggravating Factors -   Aggravating Factors Comment -   Limiting Behavior No   Relieving Factors -   Result of Injury -   Work-Related Injury -   Type of Injury -     Occupation, etc:  Mr. 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 He lives in [place] with his parents, 3 sisters, and brother. Mr. Izzy Jimenez weighs 180 pounds and is 5'10\" tall. No results found for: HBA1C, HGBE8, DLQ8BQAT, CHC9KNTY, WVK8CGUL  Weight Metrics 12/2/2019 10/25/2019 10/22/2019 8/27/2017 7/7/2014 7/2/2014   Weight 180 lb 6.4 oz - 180 lb 177 lb 156 lb 156 lb   BMI 25.88 kg/m2 - 25.83 kg/m2 24.69 kg/m2 - 20.59 kg/m2       There is no problem list on file for this patient. REVIEW OF SYSTEMS: All Below are Negative except: See HPI   Constitutional: negative for fever, chills, and weight loss.    Cardiovascular: negative for chest pain, claudication, leg swelling, SOB, COSBY   Gastrointestinal: Negative for pain, N/V/C/D, Blood in stool or urine, dysuria, hematuria, incontinence, pelvic pain. Musculoskeletal: See HPI   Neurological: Negative for dizziness and weakness. Negative for headaches, Visual changes, confusion, seizures   Phychiatric/Behavioral: Negative for depression, memory loss, substance abuse. Extremities: Negative for hair changes, rash, or skin lesion changes. Hematologic: Negative for bleeding problems, bruising, pallor or swollen lymph nodes   Peripheral Vascular: No calf pain, no circulation deficits.     Social History     Socioeconomic History    Marital status: SINGLE     Spouse name: Not on file    Number of children: Not on file    Years of education: Not on file    Highest education level: Not on file   Occupational History    Not on file   Social Needs    Financial resource strain: Not on file    Food insecurity:     Worry: Not on file     Inability: Not on file    Transportation needs:     Medical: Not on file     Non-medical: Not on file   Tobacco Use    Smoking status: Never Smoker    Smokeless tobacco: Never Used   Substance and Sexual Activity    Alcohol use: Yes     Comment: Socially    Drug use: No    Sexual activity: Not on file   Lifestyle    Physical activity:     Days per week: Not on file     Minutes per session: Not on file    Stress: Not on file   Relationships    Social connections:     Talks on phone: Not on file     Gets together: Not on file     Attends [employer] service: Not on file     Active member of club or organization: Not on file     Attends meetings of clubs or organizations: Not on file     Relationship status: Not on file    Intimate partner violence:     Fear of current or ex partner: Not on file     Emotionally abused: Not on file     Physically abused: Not on file     Forced sexual activity: Not on file   Other Topics Concern    Not on file   Social History Narrative    ** Merged History Encounter ** No Known Allergies   Current Outpatient Medications   Medication Sig    ibuprofen (MOTRIN) 600 mg tablet Take  by mouth every six (6) hours as needed for Pain.  ibuprofen (MOTRIN) 600 mg tablet Take 1 Tab by mouth every six (6) hours as needed for Pain. No current facility-administered medications for this visit. PHYSICAL EXAMINATION:  Visit Vitals  /73   Pulse 82   Temp 97.9 °F (36.6 °C) (Oral)   Resp 18   Ht 5' 10\" (1.778 m)   Wt 180 lb 6.4 oz (81.8 kg)   SpO2 99%   BMI 25.88 kg/m²      ORTHO EXAMINATION:  Examination Right knee Left knee   Skin Intact Intact   Range of motion 120-0 125-5   Effusion - -   Medial joint line tenderness - + medial retinaculum   Lateral joint line tenderness - -   Popliteal tenderness - -   Osteophytes palpable - -   Ginnas - -   Patella crepitus - -   Anterior drawer - -   Lateral laxity - -   Medial laxity - -   Varus deformity - -   Valgus deformity - -   Pretibial edema - -   Calf tenderness - -     Wearing soft knee brace    RADIOGRAPHS:  XR LEFT KNEE 10/23/19 [facility]  IMPRESSION: Moderate joint effusion  -I have independently reviewed these images during this office visit. -Dr. Eduin Araya:  Three views - No fractures, + effusion, no joint space narrowing, no osteophytes present. Kellgren David grade 0, patella is well located      IMPRESSION:      ICD-10-CM ICD-9-CM    1. Dislocation of left patella, subsequent encounter S83.005D V54.89      836.3    2. Acute pain of left knee M25.562 719.46      PLAN:  There is no need for surgery at this time. He will continue a brief course of outpatient physical therapy. OTC analgesics for pain. May return to full work activities. Work note provided. He will follow up as needed.       Scribed by Savannah Guerrero ([address]) as dictated by Nagi Corbin MD

## 2019-12-02 NOTE — LETTER
NOTIFICATION RETURN TO WORK  
 
12/2/2019 10:55 AM 
 
Mr. Anabella Eisenberg [address] [facility] [address] 
[address] [place] [address] To Whom It May Concern: Anabella Eisenberg is currently under the care of [address] [place] [address]. The above patient may return to full work activities--no restrictions. If there are questions or concerns please have the patient contact our office.  
 
 
 
Sincerely, 
 
 
 
Todd Elias MD

## 2019-12-02 NOTE — PROGRESS NOTES
PT DAILY TREATMENT NOTE 10-18    Patient Name: Donna Iqbal  Date:2019  : 1995  [x]  Patient  Verified  Payor: BLUE CROSS / Plan: [employer] [facility] / Product Type: PPO /    In time:904  Out time:942  Total Treatment Time (min): 38  Visit #: 3 of 8    Medicare/BCBS Only   Total Timed Codes (min):  38 1:1 Treatment Time:  38       Treatment Area: Pain in left knee [M25.562]    SUBJECTIVE  Pain Level (0-10 scale): 0  Any medication changes, allergies to medications, adverse drug reactions, diagnosis change, or new procedure performed?: [x] No    [] Yes (see summary sheet for update)  Subjective functional status/changes:   [] No changes reported  \"No pain. \"    OBJECTIVE    Modality rationale: patient declined   Min Type Additional Details    [] Estim:  []Unatt       []IFC  []Premod                        []Other:  []w/ice   []w/heat  Position:  Location:    [] Estim: []Att    []TENS instruct  []NMES                    []Other:  []w/US   []w/ice   []w/heat  Position:  Location:    []  Traction: [] Cervical       []Lumbar                       [] Prone          []Supine                       []Intermittent   []Continuous Lbs:  [] before manual  [] after manual    []  Ultrasound: []Continuous   [] Pulsed                           []1MHz   []3MHz W/cm2:  Location:    []  Iontophoresis with dexamethasone         Location: [] Take home patch   [] In clinic    []  Ice     []  heat  []  Ice massage  []  Laser   []  Anodyne Position:  Location:    []  Laser with stim  []  Other:  Position:  Location:    []  Vasopneumatic Device Pressure:       [] lo [] med [] hi   Temperature: [] lo [] med [] hi   [] Skin assessment post-treatment:  []intact []redness- no adverse reaction    []redness  adverse reaction:     10 min Therapeutic Exercise:  [x] See flow sheet :   Rationale: increase ROM and increase strength to improve the patients ability to perform ADLs    28 min Neuromuscular Re-education:  [x]  See flow sheet : quad re-ed activities   Rationale: increase ROM, increase strength, improve coordination, improve balance and increase proprioception  to improve the patients ability to improve mobility, stance stability, and gait        With   [x] TE   [] TA   [x] neuro   [] other: Patient Education: [x] Review HEP    [] Progressed/Changed HEP based on:   [x] positioning   [x] body mechanics   [] transfers   [] heat/ice application    [] other:      Other Objective/Functional Measures:      Pain Level (0-10 scale) post treatment: 0    ASSESSMENT/Changes in Function: Pt is doing well with his squatting mechanics. Challenged with stars and single leg RDL. Still has a slight extensor lag during SLR. Patient will continue to benefit from skilled PT services to modify and progress therapeutic interventions, address functional mobility deficits, address ROM deficits, address strength deficits, analyze and address soft tissue restrictions, analyze and cue movement patterns, analyze and modify body mechanics/ergonomics, assess and modify postural abnormalities, address imbalance/dizziness and instruct in home and community integration to attain remaining goals. [x]  See Plan of Care  []  See progress note/recertification  []  See Discharge Summary         Progress towards goals / Updated goals:  1. Patient will be independent with HEP for ROM and strengthening              PN status: PROGRESSING; Reports compliance, will update as we progress   Reports continued compliance  2. Patient will demonstrate 0-120 degrees AROM left knee to increase ease of ADLs              PN status: PROGRESSING; 0-102 deg AROM left knee   Measure at NV  3. Patient will increase FOTO to at least 60 to increase functional mobility and facilitate return to work              PN status: PROGRESSING; 57   Assess at 30 day lenora  4.  Patient will ascend and descend steps with reciprocal pattern to increase ease of entry into home and bedroom              PN status: PROGRESSING; able to perform stairs with non-reciprocal pattern    Making progress    PLAN  []  Upgrade activities as tolerated     [x]  Continue plan of care  []  Update interventions per flow sheet       []  Discharge due to:_  []  Other:_      Valerie Smith PTA, [facility] 12/2/2019  9:47 AM    Future Appointments   Date Time Provider Kal Juárez   12/2/2019 10:15 AM Melanie Bass MD [facility]   12/5/2019  9:00 AM Sam Hollins PTA [facility] [address]   12/9/2019  9:00 AM Sam Hollins PTA [facility] [address]   12/12/2019  9:00 AM Sam Hollins PTA [facility] [address]   12/16/2019  9:00 AM Charlene Del Castillo PT [facility] [address]   12/19/2019  9:00 AM Sam Hollins PTA [facility] [address]

## 2019-12-05 ENCOUNTER — APPOINTMENT (OUTPATIENT)
Dept: PHYSICAL THERAPY | Age: 24
End: 2019-12-05
Payer: COMMERCIAL

## 2019-12-09 ENCOUNTER — HOSPITAL ENCOUNTER (OUTPATIENT)
Dept: PHYSICAL THERAPY | Age: 24
Discharge: HOME | End: 2019-12-09
Payer: COMMERCIAL

## 2019-12-09 PROCEDURE — 97112 NEUROMUSCULAR REEDUCATION: CPT

## 2019-12-09 PROCEDURE — 97110 THERAPEUTIC EXERCISES: CPT

## 2019-12-09 NOTE — PROGRESS NOTES
PT DAILY TREATMENT NOTE 10-18    Patient Name: Florence Watts  Date:2019  : 1995  [x]  Patient  Verified  Payor: BLUE CROSS / Plan: [employer] [facility] / Product Type: PPO /    In time:900  Out time:940  Total Treatment Time (min): 40  Visit #: 4 of 8    Medicare/BCBS Only   Total Timed Codes (min):  40 1:1 Treatment Time:  40       Treatment Area: Pain in left knee [M25.562]    SUBJECTIVE  Pain Level (0-10 scale): 0  Any medication changes, allergies to medications, adverse drug reactions, diagnosis change, or new procedure performed?: [x] No    [] Yes (see summary sheet for update)  Subjective functional status/changes:   [] No changes reported  \"No pain. \"    OBJECTIVE    Modality rationale: patient declined   Min Type Additional Details    [] Estim:  []Unatt       []IFC  []Premod                        []Other:  []w/ice   []w/heat  Position:  Location:    [] Estim: []Att    []TENS instruct  []NMES                    []Other:  []w/US   []w/ice   []w/heat  Position:  Location:    []  Traction: [] Cervical       []Lumbar                       [] Prone          []Supine                       []Intermittent   []Continuous Lbs:  [] before manual  [] after manual    []  Ultrasound: []Continuous   [] Pulsed                           []1MHz   []3MHz W/cm2:  Location:    []  Iontophoresis with dexamethasone         Location: [] Take home patch   [] In clinic    []  Ice     []  heat  []  Ice massage  []  Laser   []  Anodyne Position:  Location:    []  Laser with stim  []  Other:  Position:  Location:    []  Vasopneumatic Device Pressure:       [] lo [] med [] hi   Temperature: [] lo [] med [] hi   [] Skin assessment post-treatment:  []intact []redness- no adverse reaction    []redness  adverse reaction:     15 min Therapeutic Exercise:  [x] See flow sheet :   Rationale: increase ROM and increase strength to improve the patients ability to perform ADLs    25 min Neuromuscular Re-education:  [x]  See flow sheet : quad re-ed activities   Rationale: increase ROM, increase strength, improve coordination, improve balance and increase proprioception  to improve the patients ability to improve mobility, stance stability, and gait        With   [x] TE   [] TA   [x] neuro   [] other: Patient Education: [x] Review HEP    [] Progressed/Changed HEP based on:   [x] positioning   [x] body mechanics   [] transfers   [] heat/ice application    [] other:      Other Objective/Functional Measures:      Pain Level (0-10 scale) post treatment: 0    ASSESSMENT/Changes in Function: Pt was able to complete straight leg raises without an extensor lag for the duration of the exercise. Improving with squatting. He still has a slight limp during his gait. Patient will continue to benefit from skilled PT services to modify and progress therapeutic interventions, address functional mobility deficits, address ROM deficits, address strength deficits, analyze and address soft tissue restrictions, analyze and cue movement patterns, analyze and modify body mechanics/ergonomics, assess and modify postural abnormalities, address imbalance/dizziness and instruct in home and community integration to attain remaining goals. [x]  See Plan of Care  []  See progress note/recertification  []  See Discharge Summary         Progress towards goals / Updated goals:  1. Patient will be independent with HEP for ROM and strengthening              PN status: PROGRESSING; Reports compliance, will update as we progress              Reports continued compliance  2. Patient will demonstrate 0-120 degrees AROM left knee to increase ease of ADLs              PN status: PROGRESSING; 0-102 deg AROM left knee              Measure at NV  3. Patient will increase FOTO to at least 60 to increase functional mobility and facilitate return to work              PN status: PROGRESSING; 57              Assess at 30 day lenora  4.  Patient will ascend and descend steps with reciprocal pattern to increase ease of entry into home and bedroom              PN status: PROGRESSING; able to perform stairs with non-reciprocal pattern               Making progress    PLAN  []  Upgrade activities as tolerated     [x]  Continue plan of care  []  Update interventions per flow sheet       []  Discharge due to:_  []  Other:_      Doris Kirk PTA, CSCS 12/9/2019  9:42 AM    Future Appointments   Date Time Provider Kal Juárez   12/12/2019  9:00 AM Noe Kent PTA [facility]   12/16/2019  9:00 AM Luis Salcido PT [facility]   12/19/2019  9:00 AM Noe Kent PTA [facility]

## 2019-12-16 ENCOUNTER — APPOINTMENT (OUTPATIENT)
Dept: PHYSICAL THERAPY | Age: 24
End: 2019-12-16
Payer: COMMERCIAL

## 2019-12-16 NOTE — PROGRESS NOTES
[facility] [address] [facility] [address], [address] [phone] fax    Discharge Summary  Patient name: Deidre Tay Start of Care: 10/31/2019   Referral source: Rojas Cordoba MD : 1995                Medical Diagnosis: Pain in left knee [M25.562]  Payor: BLUE CROSS / Plan: [employer] [facility] [place] / Product Type: PPO /  Onset Date:10/22/19                Treatment Diagnosis: left knee pain   Prior Hospitalization: see medical history Provider#: [id number]   Medications: Verified on Patient summary List    Comorbidities: none reported   Prior Level of Function: independent, working FT at [employer], active with basketball     Visits from Start of Care: 14    Missed Visits: 5    Reporting Period : 19 to 19    1. Patient will be independent with HEP for ROM and strengthening              PN status: PROGRESSING; Reports compliance, will update as we progress              MET  2. Patient will demonstrate 0-120 degrees AROM left knee to increase ease of ADLs              PN status: PROGRESSING; 0-102 deg AROM left knee              Not Met - unable to assess due to unplanned DC  3. Patient will increase FOTO to at least 60 to increase functional mobility and facilitate return to work              PN status: PROGRESSING; 62              Not Met - unable to assess due to unplanned DC  4. Patient will ascend and descend steps with reciprocal pattern to increase ease of entry into home and bedroom              PN status: PROGRESSING; able to perform stairs with non-reciprocal pattern               VSO Met - unable to assess due to unplanned DC    Assessment/Summary of care: Mr. Salomon Suazo was seen for 14 PT visits and was last seen on 19. Pt was unreachable by phone when he no showed his last scheduled appointment on 19.  Prior to pt absence he had been making progress abd was able to complete SLR without extensor lag, however, he continued to have antalgic gait. Due to inability to further his care from lack of attendance, pt is DC at this time. Should pt require further care in the future we would be happy to continue treatment with an updated order from the physician.      RECOMMENDATIONS:  [x]Discontinue therapy: []Patient has reached or is progressing toward set goals      [x]Patient is non-compliant or has abdicated      []Due to lack of appreciable progress towards set [id number], PT 12/16/2019 9:33 AM

## 2019-12-19 ENCOUNTER — APPOINTMENT (OUTPATIENT)
Dept: PHYSICAL THERAPY | Age: 24
End: 2019-12-19
Payer: COMMERCIAL